# Patient Record
Sex: MALE | Race: WHITE | NOT HISPANIC OR LATINO
[De-identification: names, ages, dates, MRNs, and addresses within clinical notes are randomized per-mention and may not be internally consistent; named-entity substitution may affect disease eponyms.]

---

## 2019-05-09 PROBLEM — Z00.00 ENCOUNTER FOR PREVENTIVE HEALTH EXAMINATION: Status: ACTIVE | Noted: 2019-05-09

## 2019-05-28 ENCOUNTER — APPOINTMENT (OUTPATIENT)
Dept: UROLOGY | Facility: CLINIC | Age: 79
End: 2019-05-28

## 2019-06-03 ENCOUNTER — APPOINTMENT (OUTPATIENT)
Dept: UROLOGY | Facility: CLINIC | Age: 79
End: 2019-06-03
Payer: MEDICARE

## 2019-06-03 VITALS
TEMPERATURE: 98.3 F | BODY MASS INDEX: 21.56 KG/M2 | WEIGHT: 154 LBS | HEIGHT: 71 IN | DIASTOLIC BLOOD PRESSURE: 76 MMHG | SYSTOLIC BLOOD PRESSURE: 148 MMHG | HEART RATE: 78 BPM

## 2019-06-03 DIAGNOSIS — Z86.39 PERSONAL HISTORY OF OTHER ENDOCRINE, NUTRITIONAL AND METABOLIC DISEASE: ICD-10-CM

## 2019-06-03 DIAGNOSIS — Z86.79 PERSONAL HISTORY OF OTHER DISEASES OF THE CIRCULATORY SYSTEM: ICD-10-CM

## 2019-06-03 PROCEDURE — 99204 OFFICE O/P NEW MOD 45 MIN: CPT

## 2019-06-03 RX ORDER — CARVEDILOL 3.12 MG/1
TABLET, FILM COATED ORAL
Refills: 0 | Status: ACTIVE | COMMUNITY

## 2019-06-03 RX ORDER — AMLODIPINE BESYLATE AND BENAZEPRIL HYDROCHLORIDE 5; 10 MG/1; MG/1
5-10 CAPSULE ORAL
Refills: 0 | Status: ACTIVE | COMMUNITY

## 2019-06-03 NOTE — PHYSICAL EXAM
[General Appearance - Well Developed] : well developed [General Appearance - In No Acute Distress] : no acute distress [Normal Appearance] : normal appearance [General Appearance - Well Nourished] : well nourished [Well Groomed] : well groomed [Edema] : no peripheral edema [Exaggerated Use Of Accessory Muscles For Inspiration] : no accessory muscle use [Respiration, Rhythm And Depth] : normal respiratory rhythm and effort [Abdomen Soft] : soft [Abdomen Tenderness] : non-tender [Costovertebral Angle Tenderness] : no ~M costovertebral angle tenderness [Prostate Size ___ gm] : prostate size [unfilled] gm [Prostate Tenderness] : the prostate was not tender [No Prostate Nodules] : no prostate nodules [No Focal Deficits] : no focal deficits [Normal Station and Gait] : the gait and station were normal for the patient's age [] : no rash [Mood] : the mood was normal [Oriented To Time, Place, And Person] : oriented to person, place, and time [Affect] : the affect was normal [Not Anxious] : not anxious

## 2019-06-03 NOTE — LETTER BODY
[Dear  ___] : Dear  [unfilled], [Courtesy Letter:] : I had the pleasure of seeing your patient, [unfilled], in my office today. [Please see my note below.] : Please see my note below. [Consult Closing:] : Thank you very much for allowing me to participate in the care of this patient.  If you have any questions, please do not hesitate to contact me. [FreeTextEntry3] : Jay Fleming MD\par Urologic Oncology\par Department of Urology\par [facility]\par \par [employer] [facility] \par \par  [Sincerely,] : Sincerely, [FreeTextEntry2] : Trey Lopez, \par [address]\[place] [address]

## 2019-06-03 NOTE — HISTORY OF PRESENT ILLNESS
[Urinary Urgency] : urinary urgency [FreeTextEntry1] : This is a 79yo male here for evaluation of elevated PSA. PSA in 4/2019 was 6.2. Repeat level was 7.2 in 5/2019. No prior PSA levels available. No family history of PCa. AUASS = 15.  [Urinary Frequency] : urinary frequency [Nocturia] : nocturia [Hematuria - Gross] : no gross hematuria [Dysuria] : no dysuria [None] : None

## 2019-06-03 NOTE — ASSESSMENT
[FreeTextEntry1] : 77yo male with elevated PSA in 6-7 range\par No prior levels available\par Prostate moderately enlarged, smooth \par Reviewed PSA screening\par Reviewed options including observation, 4K score, MRI, or biopsy\par Will repeat PSA today\par F/u 6 months for observation pending PSA results

## 2019-06-04 LAB
PSA FREE FLD-MCNC: 24 %
PSA FREE SERPL-MCNC: 1.58 NG/ML
PSA SERPL-MCNC: 6.52 NG/ML

## 2019-12-09 ENCOUNTER — APPOINTMENT (OUTPATIENT)
Dept: UROLOGY | Facility: CLINIC | Age: 79
End: 2019-12-09
Payer: MEDICARE

## 2019-12-09 PROCEDURE — 99213 OFFICE O/P EST LOW 20 MIN: CPT

## 2019-12-09 NOTE — PHYSICAL EXAM
[General Appearance - Well Developed] : well developed [General Appearance - Well Nourished] : well nourished [Normal Appearance] : normal appearance [Well Groomed] : well groomed [General Appearance - In No Acute Distress] : no acute distress [Abdomen Soft] : soft [Costovertebral Angle Tenderness] : no ~M costovertebral angle tenderness [Abdomen Tenderness] : non-tender [FreeTextEntry1] : PADMA 6/2019 no nodules [Affect] : the affect was normal [Oriented To Time, Place, And Person] : oriented to person, place, and time [Not Anxious] : not anxious [Normal Station and Gait] : the gait and station were normal for the patient's age [Mood] : the mood was normal [No Focal Deficits] : no focal deficits

## 2019-12-09 NOTE — HISTORY OF PRESENT ILLNESS
[Urinary Frequency] : urinary frequency [Urinary Urgency] : urinary urgency [FreeTextEntry1] : This is a 79yo male here for evaluation of elevated PSA. PSA in 4/2019 was 6.2. Repeat level was 7.2 in 5/2019. No prior PSA levels available. No family history of PCa. AUASS = 15. \par \par 12/09/19 Here for f/u. Last PSA was 6.5. No voiding complaints.  [Nocturia] : nocturia [Hematuria - Gross] : no gross hematuria [Dysuria] : no dysuria [None] : None

## 2019-12-09 NOTE — ASSESSMENT
[FreeTextEntry1] : 77yo male with elevated PSA in 6-7 range\par Prostate moderately enlarged, smooth \par Reviewed PSA screening\par Reviewed options including observation, 4K score, MRI, or biopsy\par Will repeat PSA today\par F/u 6 months for observation pending PSA results

## 2019-12-09 NOTE — LETTER BODY
[Dear  ___] : Dear  [unfilled], [Courtesy Letter:] : I had the pleasure of seeing your patient, [unfilled], in my office today. [Consult Closing:] : Thank you very much for allowing me to participate in the care of this patient.  If you have any questions, please do not hesitate to contact me. [Please see my note below.] : Please see my note below. [Sincerely,] : Sincerely, [FreeTextEntry3] : Jay Fleming MD\par Urologic Oncology\par Department of Urology\par [facility]\par \par [employer] [facility] \par \par  [FreeTextEntry2] : Trey Lopez, \par [address]\[place] [address]

## 2019-12-10 LAB — PSA SERPL-MCNC: 6.63 NG/ML

## 2020-08-17 ENCOUNTER — APPOINTMENT (OUTPATIENT)
Dept: UROLOGY | Facility: CLINIC | Age: 80
End: 2020-08-17
Payer: MEDICARE

## 2020-08-17 VITALS
HEART RATE: 76 BPM | TEMPERATURE: 98.1 F | SYSTOLIC BLOOD PRESSURE: 178 MMHG | HEIGHT: 71 IN | BODY MASS INDEX: 21.42 KG/M2 | OXYGEN SATURATION: 97 % | WEIGHT: 153 LBS | DIASTOLIC BLOOD PRESSURE: 93 MMHG

## 2020-08-17 PROCEDURE — 99213 OFFICE O/P EST LOW 20 MIN: CPT

## 2020-08-18 LAB — PSA SERPL-MCNC: 7.05 NG/ML

## 2020-08-18 NOTE — ASSESSMENT
[FreeTextEntry1] : 78yo male with elevated PSA in 6-7 range\par Prostate moderately enlarged, smooth \par Reviewed PSA screening\par Reviewed options including observation, 4K score, MRI, or biopsy\par Will repeat PSA today\par F/u 6 months for observation pending PSA results

## 2020-08-18 NOTE — LETTER BODY
[Dear  ___] : Dear  [unfilled], [Courtesy Letter:] : I had the pleasure of seeing your patient, [unfilled], in my office today. [Please see my note below.] : Please see my note below. [Consult Closing:] : Thank you very much for allowing me to participate in the care of this patient.  If you have any questions, please do not hesitate to contact me. [FreeTextEntry2] : Trey Lopez, \par [address]\[place] [address] [Sincerely,] : Sincerely, [FreeTextEntry3] : Jay Fleming MD\par Urologic Oncology\par Department of Urology\par [facility]\par \par [employer] [facility] \par \par

## 2020-08-18 NOTE — PHYSICAL EXAM
[General Appearance - Well Nourished] : well nourished [General Appearance - Well Developed] : well developed [Well Groomed] : well groomed [Normal Appearance] : normal appearance [Abdomen Soft] : soft [General Appearance - In No Acute Distress] : no acute distress [Abdomen Tenderness] : non-tender [Costovertebral Angle Tenderness] : no ~M costovertebral angle tenderness [No Prostate Nodules] : no prostate nodules [Oriented To Time, Place, And Person] : oriented to person, place, and time [Affect] : the affect was normal [Not Anxious] : not anxious [Mood] : the mood was normal [Normal Station and Gait] : the gait and station were normal for the patient's age [No Focal Deficits] : no focal deficits

## 2020-08-18 NOTE — HISTORY OF PRESENT ILLNESS
[FreeTextEntry1] : This is a 79yo male here for evaluation of elevated PSA. PSA in 4/2019 was 6.2. Repeat level was 7.2 in 5/2019. No prior PSA levels available. No family history of PCa. AUASS = 15. \par \par 12/09/19 Here for f/u. Last PSA was 6.5. No voiding complaints. \par \par 8/17/20 Here for f/u. Last PSA was stable in 6-7 range. No significant voiding complaints.  [Urinary Urgency] : urinary urgency [Urinary Frequency] : urinary frequency [Dysuria] : no dysuria [Nocturia] : nocturia [Hematuria - Gross] : no gross hematuria [None] : None

## 2021-03-22 ENCOUNTER — APPOINTMENT (OUTPATIENT)
Dept: UROLOGY | Facility: CLINIC | Age: 81
End: 2021-03-22
Payer: MEDICARE

## 2021-03-22 VITALS — TEMPERATURE: 98.1 F | DIASTOLIC BLOOD PRESSURE: 87 MMHG | SYSTOLIC BLOOD PRESSURE: 168 MMHG | HEART RATE: 73 BPM

## 2021-03-22 PROCEDURE — 99213 OFFICE O/P EST LOW 20 MIN: CPT

## 2021-03-22 PROCEDURE — 99072 ADDL SUPL MATRL&STAF TM PHE: CPT

## 2021-03-22 NOTE — LETTER BODY
[Dear  ___] : Dear  [unfilled], [Courtesy Letter:] : I had the pleasure of seeing your patient, [unfilled], in my office today. [Please see my note below.] : Please see my note below. [Consult Closing:] : Thank you very much for allowing me to participate in the care of this patient.  If you have any questions, please do not hesitate to contact me. [Sincerely,] : Sincerely, [FreeTextEntry2] : Trey Lopez, \par [address]\[place] [address] [FreeTextEntry3] : Jay Fleming MD\par Urologic Oncology\par Department of Urology\par [facility]\par \par [employer] [facility] \par \par

## 2021-03-22 NOTE — HISTORY OF PRESENT ILLNESS
[FreeTextEntry1] : This is a 77yo male here for evaluation of elevated PSA. PSA in 4/2019 was 6.2. Repeat level was 7.2 in 5/2019. No prior PSA levels available. No family history of PCa. AUASS = 15. \par \par 12/09/19 Here for f/u. Last PSA was 6.5. No voiding complaints. \par \par 8/17/20 Here for f/u. Last PSA was stable in 6-7 range. No significant voiding complaints. \par \par 3/22/21 Here for f/u. Last PSA was stable in 6-7 range. No significant voiding complaints.  [Urinary Urgency] : urinary urgency [Urinary Frequency] : urinary frequency [Nocturia] : nocturia [Dysuria] : no dysuria [Hematuria - Gross] : no gross hematuria [None] : None

## 2021-03-22 NOTE — PHYSICAL EXAM
[General Appearance - Well Developed] : well developed [General Appearance - Well Nourished] : well nourished [Normal Appearance] : normal appearance [Well Groomed] : well groomed [General Appearance - In No Acute Distress] : no acute distress [Abdomen Soft] : soft [Abdomen Tenderness] : non-tender [Costovertebral Angle Tenderness] : no ~M costovertebral angle tenderness [FreeTextEntry1] : PADMA 8/2020 no nodules [Oriented To Time, Place, And Person] : oriented to person, place, and time [Affect] : the affect was normal [Mood] : the mood was normal [Not Anxious] : not anxious [Normal Station and Gait] : the gait and station were normal for the patient's age [No Focal Deficits] : no focal deficits

## 2021-03-22 NOTE — ASSESSMENT
[FreeTextEntry1] : 81yo male with elevated PSA in 6-7 range\par Prostate moderately enlarged, smooth \par Reviewed PSA screening\par Will repeat PSA today\par F/u 6 months for observation pending PSA results

## 2021-03-23 ENCOUNTER — NON-APPOINTMENT (OUTPATIENT)
Age: 81
End: 2021-03-23

## 2021-03-23 LAB — PSA SERPL-MCNC: 7.81 NG/ML

## 2021-09-20 ENCOUNTER — APPOINTMENT (OUTPATIENT)
Dept: UROLOGY | Facility: CLINIC | Age: 81
End: 2021-09-20
Payer: MEDICARE

## 2021-09-20 VITALS
HEART RATE: 67 BPM | BODY MASS INDEX: 21.42 KG/M2 | WEIGHT: 153 LBS | DIASTOLIC BLOOD PRESSURE: 83 MMHG | SYSTOLIC BLOOD PRESSURE: 141 MMHG | HEIGHT: 71 IN | TEMPERATURE: 97.6 F

## 2021-09-20 LAB — PSA SERPL-MCNC: 9.9 NG/ML

## 2021-09-20 PROCEDURE — 99213 OFFICE O/P EST LOW 20 MIN: CPT

## 2021-09-20 NOTE — HISTORY OF PRESENT ILLNESS
[FreeTextEntry1] : This is a 77yo male here for evaluation of elevated PSA. PSA in 4/2019 was 6.2. Repeat level was 7.2 in 5/2019. No prior PSA levels available. No family history of PCa. AUASS = 15. \par \par 12/09/19 Here for f/u. Last PSA was 6.5. No voiding complaints. \par \par 8/17/20 Here for f/u. Last PSA was stable in 6-7 range. No significant voiding complaints. \par \par 3/22/21 Here for f/u. Last PSA was stable in 6-7 range. No significant voiding complaints. \par \par 9/20/21 Here for f/u. Last PSA = 7.8.  No significant voiding complaints.  [Urinary Urgency] : urinary urgency [Urinary Frequency] : urinary frequency [Nocturia] : nocturia [Dysuria] : no dysuria [Hematuria - Gross] : no gross hematuria [None] : None

## 2021-09-20 NOTE — LETTER BODY
[Dear  ___] : Dear  [unfilled], [Courtesy Letter:] : I had the pleasure of seeing your patient, [unfilled], in my office today. [Consult Closing:] : Thank you very much for allowing me to participate in the care of this patient.  If you have any questions, please do not hesitate to contact me. [Please see my note below.] : Please see my note below. [Sincerely,] : Sincerely, [FreeTextEntry2] : Trey Lopez, \par [address]\[place] [address] [FreeTextEntry3] : Jay Fleming MD\par Urologic Oncology\par Department of Urology\par [facility]\par \par [employer] [facility] \par \par

## 2021-09-20 NOTE — PHYSICAL EXAM
[General Appearance - Well Developed] : well developed [General Appearance - Well Nourished] : well nourished [Normal Appearance] : normal appearance [Well Groomed] : well groomed [General Appearance - In No Acute Distress] : no acute distress [Abdomen Soft] : soft [Abdomen Tenderness] : non-tender [Costovertebral Angle Tenderness] : no ~M costovertebral angle tenderness [No Prostate Nodules] : no prostate nodules [Oriented To Time, Place, And Person] : oriented to person, place, and time [Affect] : the affect was normal [Mood] : the mood was normal [Not Anxious] : not anxious [Normal Station and Gait] : the gait and station were normal for the patient's age [No Focal Deficits] : no focal deficits

## 2021-09-20 NOTE — ASSESSMENT
[FreeTextEntry1] : 79yo male with elevated PSA in 6-7 range\par Prostate moderately enlarged, smooth \par Reviewed PSA screening\par Will repeat PSA today\par F/u 6 months for observation pending PSA results

## 2021-09-22 ENCOUNTER — NON-APPOINTMENT (OUTPATIENT)
Age: 81
End: 2021-09-22

## 2021-09-30 ENCOUNTER — NON-APPOINTMENT (OUTPATIENT)
Age: 81
End: 2021-09-30

## 2021-10-20 ENCOUNTER — NON-APPOINTMENT (OUTPATIENT)
Age: 81
End: 2021-10-20

## 2021-12-12 LAB — SARS-COV-2 N GENE NPH QL NAA+PROBE: NOT DETECTED

## 2021-12-13 ENCOUNTER — TRANSCRIPTION ENCOUNTER (OUTPATIENT)
Age: 81
End: 2021-12-13

## 2021-12-14 ENCOUNTER — RESULT REVIEW (OUTPATIENT)
Age: 81
End: 2021-12-14

## 2021-12-14 ENCOUNTER — APPOINTMENT (OUTPATIENT)
Dept: UROLOGY | Facility: AMBULATORY SURGERY CENTER | Age: 81
End: 2021-12-14

## 2021-12-14 ENCOUNTER — OUTPATIENT (OUTPATIENT)
Dept: OUTPATIENT SERVICES | Facility: HOSPITAL | Age: 81
LOS: 1 days | Discharge: HOME | End: 2021-12-14
Payer: MEDICARE

## 2021-12-14 PROCEDURE — 55706 BX PRST8 NDL SAT SAMPLING: CPT

## 2021-12-14 PROCEDURE — 76377 3D RENDER W/INTRP POSTPROCES: CPT | Mod: 26

## 2021-12-14 PROCEDURE — 76872 US TRANSRECTAL: CPT | Mod: 26

## 2021-12-14 PROCEDURE — 88305 TISSUE EXAM BY PATHOLOGIST: CPT | Mod: 26

## 2021-12-17 LAB — SURGICAL PATHOLOGY STUDY: SIGNIFICANT CHANGE UP

## 2021-12-22 ENCOUNTER — APPOINTMENT (OUTPATIENT)
Dept: UROLOGY | Facility: CLINIC | Age: 81
End: 2021-12-22
Payer: MEDICARE

## 2021-12-22 VITALS — DIASTOLIC BLOOD PRESSURE: 77 MMHG | TEMPERATURE: 98.2 F | SYSTOLIC BLOOD PRESSURE: 152 MMHG | HEART RATE: 78 BPM

## 2021-12-22 PROCEDURE — 99215 OFFICE O/P EST HI 40 MIN: CPT | Mod: 24

## 2021-12-23 ENCOUNTER — TRANSCRIPTION ENCOUNTER (OUTPATIENT)
Age: 81
End: 2021-12-23

## 2021-12-26 NOTE — ASSESSMENT
[FreeTextEntry1] : 82yo male s/p MRI fusion biopsy 12/14/21 \par Reviewed path: cT1c, Desi 8, GG2, involving 2/13 cores, PSA 9.9\par Discussed biopsy findings with patient and treatment options\par Patient is considered high risk by NCCN guidelines. \par Discussed need for bone scan for staging\par Natural history of high risk prostate cancer reviewed\par Standard treatment options including radical prostatectomy or radiation therapy were discussed. Radiation should be given with adjuvant hormone treatment for 2-3 years.\par Discussed alternative treatment options including focal therapy and watchful waiting.\par Radical prostatectomy is often performed by robot assisted laparoscopic technique. Discussed immediate risks of surgery including bleeding, infection, blood clot or injury to surrounding organs. Discussed long term risks including urinary incontinence and sexual dysfunction. \par Discussed risks of radiation treatment including urinary and bowel symptoms and sexual dysfunction. \par Discussed risks of adjuvant hormone treatment including hot flashes, fatigue, sexual dysfunction. \par Because the patient is high risk, he may benefit from multimodal therapy such as surgery followed by adjuvant radiation depending on final pathology results\par Considering his age, would recommend radiation + ADT\par Bone scan ordered\par Referred to Rad Onc\par F/u 1 month

## 2021-12-26 NOTE — LETTER BODY
[Dear  ___] : Dear  [unfilled], [Courtesy Letter:] : I had the pleasure of seeing your patient, [unfilled], in my office today. [Please see my note below.] : Please see my note below. [Consult Closing:] : Thank you very much for allowing me to participate in the care of this patient.  If you have any questions, please do not hesitate to contact me. [Sincerely,] : Sincerely, [FreeTextEntry2] : Trey Lopez, \par [address]\[place] [address] [FreeTextEntry3] : Jay Fleming MD\par Urologic Oncology\par Department of Urology\par [facility]\par \par [employer] at [facility] \par \par

## 2021-12-26 NOTE — PHYSICAL EXAM
[General Appearance - Well Developed] : well developed [General Appearance - Well Nourished] : well nourished [Normal Appearance] : normal appearance [Well Groomed] : well groomed [General Appearance - In No Acute Distress] : no acute distress [Abdomen Soft] : soft [Abdomen Tenderness] : non-tender [Costovertebral Angle Tenderness] : no ~M costovertebral angle tenderness [Oriented To Time, Place, And Person] : oriented to person, place, and time [Affect] : the affect was normal [Mood] : the mood was normal [Not Anxious] : not anxious [Normal Station and Gait] : the gait and station were normal for the patient's age [No Focal Deficits] : no focal deficits [FreeTextEntry1] : Recent PADMA no nodule

## 2021-12-26 NOTE — HISTORY OF PRESENT ILLNESS
[Urinary Urgency] : urinary urgency [Urinary Frequency] : urinary frequency [Nocturia] : nocturia [None] : None [FreeTextEntry1] : This is a 77yo male here for evaluation of elevated PSA. PSA in 4/2019 was 6.2. Repeat level was 7.2 in 5/2019. No prior PSA levels available. No family history of PCa. AUASS = 15. \par \par 12/09/19 Here for f/u. Last PSA was 6.5. No voiding complaints. \par \par 8/17/20 Here for f/u. Last PSA was stable in 6-7 range. No significant voiding complaints. \par \par 3/22/21 Here for f/u. Last PSA was stable in 6-7 range. No significant voiding complaints. \par \par 9/20/21 Here for f/u. Last PSA = 7.8.  No significant voiding complaints. \par \par 12/22/21 Here for f/u. PSA in 9/2021 up to 9.9. MRI 10/2021 showed PI-RADS 5 lesion right peripheral zone\par S/p MRI fusion biopsy 12/14/21\par Path: [place] 4+4=8, GG4, 2/13 cores including MRI lesion and right peripheral zone  [Dysuria] : no dysuria [Hematuria - Gross] : no gross hematuria

## 2022-01-18 ENCOUNTER — APPOINTMENT (OUTPATIENT)
Dept: RADIATION ONCOLOGY | Facility: CLINIC | Age: 82
End: 2022-01-18
Payer: MEDICARE

## 2022-01-18 VITALS
WEIGHT: 153 LBS | BODY MASS INDEX: 21.66 KG/M2 | DIASTOLIC BLOOD PRESSURE: 84 MMHG | HEART RATE: 83 BPM | OXYGEN SATURATION: 97 % | HEIGHT: 70.5 IN | SYSTOLIC BLOOD PRESSURE: 154 MMHG | TEMPERATURE: 98.3 F

## 2022-01-18 DIAGNOSIS — Z60.2 PROBLEMS RELATED TO LIVING ALONE: ICD-10-CM

## 2022-01-18 DIAGNOSIS — Z78.9 OTHER SPECIFIED HEALTH STATUS: ICD-10-CM

## 2022-01-18 PROCEDURE — 99204 OFFICE O/P NEW MOD 45 MIN: CPT | Mod: 25

## 2022-01-18 RX ORDER — SIMVASTATIN 20 MG/1
20 TABLET, FILM COATED ORAL
Qty: 90 | Refills: 0 | Status: ACTIVE | COMMUNITY
Start: 2022-01-03

## 2022-01-18 RX ORDER — PRAVASTATIN SODIUM 20 MG/1
20 TABLET ORAL
Refills: 0 | Status: DISCONTINUED | COMMUNITY
End: 2022-01-18

## 2022-01-18 SDOH — SOCIAL STABILITY - SOCIAL INSECURITY: PROBLEMS RELATED TO LIVING ALONE: Z60.2

## 2022-01-18 NOTE — REVIEW OF SYSTEMS
[Nocturia] : nocturia [Urinary Frequency] : urinary frequency [IPSS Score (0-40): ___] : IPSS score: [unfilled] [Negative] : Psychiatric

## 2022-01-30 NOTE — PHYSICAL EXAM
[Hearing Threshold Finger Rub Not Reynolds] : hearing was normal [] : no respiratory distress [Respiration, Rhythm And Depth] : normal respiratory rhythm and effort [Exaggerated Use Of Accessory Muscles For Inspiration] : no accessory muscle use [Normal] : no joint swelling, no clubbing or cyanosis of the fingernails and muscle strength and tone were normal [FreeTextEntry1] : declined

## 2022-01-30 NOTE — DISEASE MANAGEMENT
[1] : T1 [c] : c [0-10] : 0 -10 ng/mL [Biopsy with Fusion] : Patient had a biopsy with fusion on [8] : Fusion Biopsy Desi Score: 8 [] : Patient had a Prostate MRI [5] : 5 [IIC] : IIC [BiopsyDate] : 12/14/21 [MeasuredProstateVolume] : 71g [TotalCores] : 13 [TotalPositiveCores] : 2 [MaxCoreInvolvement] : 60

## 2022-01-30 NOTE — HISTORY OF PRESENT ILLNESS
[FreeTextEntry1] : Mr. Oscar Reyna is an 81 year-old male referred by Dr. 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  He presents today to review his definitive treatment options.\par \par Oncologic History:\par He has a had a rising PSA as follows:\par 6/3/19 - 6.52ng/ml\par 12/9/19 - 6.63ng/ml\par 8/17/20 - 7.05ng/ml\par 3/22/21 - 7.81ng/ml\par 9/20/21 - 9.90ng/ml\par \par 10/7/21 MRI Prostate ([facility]): \par - Volume 61 cc\par - Lesion in the lateral/posterolateral right peripheral zone at the mid gland/apex with evidence of extracapsular extension - PI-RADS 5\par - No seminal vesicle invasion, pelvic lymphadenopathy, or bony metastases.\par \par 12/14/21 MRI fusion biopsy \par Final Diagnosis \par 1. Prostate, left anterior apex, biopsy\par -Benign prostate tissue.\par 2. Prostate, left anterior base, biopsy\par -Benign prostate tissue.\par 3. Prostate, right anterior apex, biopsy\par -Benign fibromuscular tissue.\par -Prostatic glands are not identified.\par 4. Prostate, right anterior base, biopsy\par -Benign prostate tissue.\par 5. Prostate, midline apex, biopsy\par -Benign prostate tissue.\par 6. Prostate, midline base, biopsy\par -Benign prostate tissue.\par 7. Prostate, left posterior apex, biopsy\par -Benign prostate tissue.\par 8. Prostate, left posterior base, biopsy\par -Benign prostate tissue.\par 9. Prostate, right posterior apex, biopsy\par -Benign prostate tissue.\par 10. Prostate, right posterior base, biopsy\par -Adenocarcinoma of the prostate, Prognostic Grade Group 4 (Desi\par score 4+4=8) involving 60% (7 mm in length) of 1 of 1 core(s).\par 11. Prostate, left lateral, biopsy\par -Benign prostate tissue.\par 12. Prostate, right lateral, biopsy\par -Benign prostate tissue.\par 13. Prostate, right PZ, biopsy\par -Adenocarcinoma of the prostate, Prognostic Grade Group 4 (Desi\par score 4+4=8) involving 60%, 50% and 40% (6 mm, 5.5 mm and 5 mm in length)\par of 3 of 3 core(s).\par Note: Cribriform Desi pattern 4 is present.\par \par He is scheduled to undergo bone scan on 1/25/22 at [facility].  He denies a history of radiation.  He notes moderate baseline urinary function, with frequency, urgency, nocturia x 1-2, and weak FOS.  He does take Saw Palmetto.  He denies a history of baseline hemorrhoids.  He notes his last colonoscopy was in 2009 and, per patient, was without abnormality.  He notes moderate baseline erectile function he can achieve erection and orgasm with masturbation.  He declines sperm banking.\par

## 2022-01-31 ENCOUNTER — APPOINTMENT (OUTPATIENT)
Dept: UROLOGY | Facility: CLINIC | Age: 82
End: 2022-01-31

## 2022-02-07 ENCOUNTER — APPOINTMENT (OUTPATIENT)
Dept: UROLOGY | Facility: CLINIC | Age: 82
End: 2022-02-07
Payer: MEDICARE

## 2022-02-07 VITALS — DIASTOLIC BLOOD PRESSURE: 77 MMHG | TEMPERATURE: 97.6 F | SYSTOLIC BLOOD PRESSURE: 148 MMHG | HEART RATE: 92 BPM

## 2022-02-07 PROCEDURE — 99214 OFFICE O/P EST MOD 30 MIN: CPT

## 2022-02-07 NOTE — LETTER BODY
[Dear  ___] : Dear  [unfilled], [Courtesy Letter:] : I had the pleasure of seeing your patient, [unfilled], in my office today. [Please see my note below.] : Please see my note below. [Consult Closing:] : Thank you very much for allowing me to participate in the care of this patient.  If you have any questions, please do not hesitate to contact me. [Sincerely,] : Sincerely, [FreeTextEntry2] : Trey Lopez, \par [address]\[place] [address] [FreeTextEntry3] : Jay Fleming MD\par Urologic Oncology\par Department of Urology\par [facility]\par \par [employer] [facility] \par \par

## 2022-02-07 NOTE — ASSESSMENT
[FreeTextEntry1] : 82yo male s/p MRI fusion biopsy 12/14/21 \par Reviewed path: cT1c, Desi 8, GG2, involving 2/13 cores, PSA 9.9\par Patient is considered high risk by NCCN guidelines. \par Reviewed NM bone scan, equivocal lesion at L4\par Recommend PSMA PET for further characterization of L4 bone lesion\par He is interested in proceeding with EBRT\par Discussed risks/benefits of ADT. 2 years of ADT is recommended but he is concerned about side effects\par Considering his age and low volume disease, reasonable to defer ADT pending PSMA results\par If PSMA is negative, will plan to defer ADT\par If PSMA is positive, would recommend EBRT with ADT

## 2022-02-07 NOTE — HISTORY OF PRESENT ILLNESS
[Urinary Urgency] : urinary urgency [Urinary Frequency] : urinary frequency [Nocturia] : nocturia [None] : None [FreeTextEntry1] : This is a 79yo male here for evaluation of elevated PSA. PSA in 4/2019 was 6.2. Repeat level was 7.2 in 5/2019. No prior PSA levels available. No family history of PCa. AUASS = 15. \par \par 12/09/19 Here for f/u. Last PSA was 6.5. No voiding complaints. \par \par 8/17/20 Here for f/u. Last PSA was stable in 6-7 range. No significant voiding complaints. \par \par 3/22/21 Here for f/u. Last PSA was stable in 6-7 range. No significant voiding complaints. \par \par 9/20/21 Here for f/u. Last PSA = 7.8.  No significant voiding complaints. \par \par 12/22/21 Here for f/u. PSA in 9/2021 up to 9.9. MRI 10/2021 showed PI-RADS 5 lesion right peripheral zone\par S/p MRI fusion biopsy 12/14/21\par Path: [place] 4+4=8, GG4, 2/13 cores including MRI lesion and right peripheral zone \par \par 2/07/22 Here for f/u. Had bone scan which shows equivocal bone lesion in L4. No symptoms\par Met with Rad Onc. Interested in proceeding with radiation but interested in deferring ADT\par \par  [Dysuria] : no dysuria [Hematuria - Gross] : no gross hematuria

## 2022-02-08 LAB
ALBUMIN SERPL ELPH-MCNC: 5 G/DL
ALP BLD-CCNC: 75 U/L
ALT SERPL-CCNC: 22 U/L
ANION GAP SERPL CALC-SCNC: 14 MMOL/L
AST SERPL-CCNC: 24 U/L
BILIRUB SERPL-MCNC: 0.8 MG/DL
BUN SERPL-MCNC: 18 MG/DL
CALCIUM SERPL-MCNC: 9.8 MG/DL
CHLORIDE SERPL-SCNC: 100 MMOL/L
CO2 SERPL-SCNC: 27 MMOL/L
CREAT SERPL-MCNC: 0.84 MG/DL
GLUCOSE SERPL-MCNC: 120 MG/DL
POTASSIUM SERPL-SCNC: 3.7 MMOL/L
PROT SERPL-MCNC: 7.6 G/DL
PSA SERPL-MCNC: 11.4 NG/ML
SODIUM SERPL-SCNC: 141 MMOL/L

## 2022-02-09 ENCOUNTER — NON-APPOINTMENT (OUTPATIENT)
Age: 82
End: 2022-02-09

## 2022-03-02 ENCOUNTER — OUTPATIENT (OUTPATIENT)
Dept: OUTPATIENT SERVICES | Facility: HOSPITAL | Age: 82
LOS: 1 days | End: 2022-03-02
Payer: MEDICARE

## 2022-03-02 LAB — GLUCOSE BLDC GLUCOMTR-MCNC: 88 MG/DL — SIGNIFICANT CHANGE UP (ref 70–99)

## 2022-03-02 PROCEDURE — A9595: CPT

## 2022-03-02 PROCEDURE — 78816 PET IMAGE W/CT FULL BODY: CPT

## 2022-03-02 PROCEDURE — 78816 PET IMAGE W/CT FULL BODY: CPT | Mod: 26,PI

## 2022-03-02 PROCEDURE — 82962 GLUCOSE BLOOD TEST: CPT

## 2022-03-21 ENCOUNTER — APPOINTMENT (OUTPATIENT)
Dept: UROLOGY | Facility: CLINIC | Age: 82
End: 2022-03-21
Payer: MEDICARE

## 2022-03-21 VITALS — TEMPERATURE: 98.3 F | SYSTOLIC BLOOD PRESSURE: 137 MMHG | HEART RATE: 70 BPM | DIASTOLIC BLOOD PRESSURE: 81 MMHG

## 2022-03-21 PROCEDURE — 99214 OFFICE O/P EST MOD 30 MIN: CPT

## 2022-03-21 NOTE — ASSESSMENT
[FreeTextEntry1] : 80yo male s/p MRI fusion biopsy 12/14/21 \par Reviewed path: cT1c, Desi 8, GG2, involving 2/13 cores, PSA 9.9\par Patient is considered high risk by NCCN guidelines. \par Reviewed NM bone scan, equivocal lesion at L4\par Reviewed PSMA PET, no bone lesion but concerning small lymph nodes that are PET avid\par \par He is interested in proceeding with EBRT\par Discussed risks/benefits of ADT. 2 years of ADT is recommended but he is concerned about side effects\par Recommend Orgovyx which is associated with lower cardiovascular risks\par He is willing to trial this medication \par NEJM paper reviewed with patient\par \par F/u with Rad Onc

## 2022-03-21 NOTE — LETTER BODY
[Dear  ___] : Dear  [unfilled], [Courtesy Letter:] : I had the pleasure of seeing your patient, [unfilled], in my office today. [Please see my note below.] : Please see my note below. [Consult Closing:] : Thank you very much for allowing me to participate in the care of this patient.  If you have any questions, please do not hesitate to contact me. [Sincerely,] : Sincerely, [FreeTextEntry2] : Trey Lopez, \par [address]\[place] [address] [FreeTextEntry3] : Jay Fleming MD\par Urologic Oncology\par Department of Urology\par [facility]\par \par [employer] [facility] \par \par

## 2022-03-21 NOTE — HISTORY OF PRESENT ILLNESS
[Urinary Urgency] : urinary urgency [Urinary Frequency] : urinary frequency [Nocturia] : nocturia [None] : None [FreeTextEntry1] : This is a 79yo male here for evaluation of elevated PSA. PSA in 4/2019 was 6.2. Repeat level was 7.2 in 5/2019. No prior PSA levels available. No family history of PCa. AUASS = 15. \par \par 12/09/19 Here for f/u. Last PSA was 6.5. No voiding complaints. \par \par 8/17/20 Here for f/u. Last PSA was stable in 6-7 range. No significant voiding complaints. \par \par 3/22/21 Here for f/u. Last PSA was stable in 6-7 range. No significant voiding complaints. \par \par 9/20/21 Here for f/u. Last PSA = 7.8.  No significant voiding complaints. \par \par 12/22/21 Here for f/u. PSA in 9/2021 up to 9.9. MRI 10/2021 showed PI-RADS 5 lesion right peripheral zone\par S/p MRI fusion biopsy 12/14/21\par Path: [place] 4+4=8, GG4, 2/13 cores including MRI lesion and right peripheral zone \par \par 2/07/22 Here for f/u. Had bone scan which shows equivocal bone lesion in L4. No symptoms\par Met with Rad Onc. Interested in proceeding with radiation but interested in deferring ADT\par \par 3/21/22 Here for f/u to review PSMA PET\par PET results:\par 1. Since 10/7/2021, the prostate is again enlarged and contains a focus of pilflufolastat avidity in the right (identified on prior MR prostate) posterior transitional zone toward the apex, consistent with known prostate malignancy.\par \par 2. There is a 0.9 cm intensely pilflufolastat avid (SUV 24.6) perirectal lymph node and 0.5 mm mildly avid lymph node slightly superiorly, consistent with local metastasis.\par \par 3. 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  Unfortunately this possibility cannot be confirmed on the accompanying non-contrast CT and no definite adenopathy is present.\par  [Dysuria] : no dysuria [Hematuria - Gross] : no gross hematuria

## 2022-03-23 RX ORDER — RELUGOLIX 120 MG/1
120 TABLET, FILM COATED ORAL DAILY
Qty: 30 | Refills: 3 | Status: ACTIVE | COMMUNITY
Start: 2022-03-21 | End: 1900-01-01

## 2022-05-23 ENCOUNTER — APPOINTMENT (OUTPATIENT)
Dept: UROLOGY | Facility: CLINIC | Age: 82
End: 2022-05-23
Payer: MEDICARE

## 2022-05-23 VITALS
TEMPERATURE: 97.3 F | HEART RATE: 71 BPM | OXYGEN SATURATION: 98 % | SYSTOLIC BLOOD PRESSURE: 139 MMHG | DIASTOLIC BLOOD PRESSURE: 84 MMHG

## 2022-05-23 PROCEDURE — 99213 OFFICE O/P EST LOW 20 MIN: CPT

## 2022-05-23 NOTE — ASSESSMENT
[FreeTextEntry1] : 80yo male cT1c, Desi 8, GG2, involving 2/13 cores, PSA 9.9\par Patient is considered high risk by NCCN guidelines. \par Reviewed PSMA PET, no bone lesion but concerning small lymph nodes that are PET avid\par He is interested in proceeding with EBRT\par 2 years of ADT is recommended\par Continue Orgovyx which is associated with lower cardiovascular risks\par Tolerating Orgovyx well\par F/u with Rad Onc \par PSA, testosterone today\par \par F/u 3 months

## 2022-05-23 NOTE — LETTER BODY
[Dear  ___] : Dear  [unfilled], [Courtesy Letter:] : I had the pleasure of seeing your patient, [unfilled], in my office today. [Please see my note below.] : Please see my note below. [Consult Closing:] : Thank you very much for allowing me to participate in the care of this patient.  If you have any questions, please do not hesitate to contact me. [Sincerely,] : Sincerely, [FreeTextEntry2] : Trey Lopez, \par [address]\[place] [address] [FreeTextEntry3] : Jay Fleming MD\par Urologic Oncology\par Department of Urology\par [facility]\par \par [employer] [facility] \par \par

## 2022-05-23 NOTE — HISTORY OF PRESENT ILLNESS
[Urinary Urgency] : urinary urgency [Urinary Frequency] : urinary frequency [Nocturia] : nocturia [None] : None [FreeTextEntry1] : This is a 79yo male here for evaluation of elevated PSA. PSA in 4/2019 was 6.2. Repeat level was 7.2 in 5/2019. No prior PSA levels available. No family history of PCa. AUASS = 15. \par \par 12/09/19 Here for f/u. Last PSA was 6.5. No voiding complaints. \par \par 8/17/20 Here for f/u. Last PSA was stable in 6-7 range. No significant voiding complaints. \par \par 3/22/21 Here for f/u. Last PSA was stable in 6-7 range. No significant voiding complaints. \par \par 9/20/21 Here for f/u. Last PSA = 7.8.  No significant voiding complaints. \par \par 12/22/21 Here for f/u. PSA in 9/2021 up to 9.9. MRI 10/2021 showed PI-RADS 5 lesion right peripheral zone\par S/p MRI fusion biopsy 12/14/21\par Path: [place] 4+4=8, GG4, 2/13 cores including MRI lesion and right peripheral zone \par \par 2/07/22 Here for f/u. Had bone scan which shows equivocal bone lesion in L4. No symptoms\par Met with Rad Onc. Interested in proceeding with radiation but interested in deferring ADT\par \par 3/21/22 Here for f/u to review PSMA PET\par PET results:\par 1. Since 10/7/2021, the prostate is again enlarged and contains a focus of pilflufolastat avidity in the right (identified on prior MR prostate) posterior transitional zone toward the apex, consistent with known prostate malignancy.\par \par 2. There is a 0.9 cm intensely pilflufolastat avid (SUV 24.6) perirectal lymph node and 0.5 mm mildly avid lymph node slightly superiorly, consistent with local metastasis.\par \par 3. 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  Unfortunately this possibility cannot be confirmed on the accompanying non-contrast CT and no definite adenopathy is present.\par \par 5/23/22 Here for f/u. Has been on Orgovyx for about 6 weeks. Doing well, minimal side effects. Has not started radiation yet. \par  [Dysuria] : no dysuria [Hematuria - Gross] : no gross hematuria

## 2022-05-24 ENCOUNTER — APPOINTMENT (OUTPATIENT)
Dept: RADIATION ONCOLOGY | Facility: CLINIC | Age: 82
End: 2022-05-24
Payer: MEDICARE

## 2022-05-24 ENCOUNTER — NON-APPOINTMENT (OUTPATIENT)
Age: 82
End: 2022-05-24

## 2022-05-24 ENCOUNTER — APPOINTMENT (OUTPATIENT)
Dept: RADIATION ONCOLOGY | Facility: CLINIC | Age: 82
End: 2022-05-24

## 2022-05-24 LAB
ALBUMIN SERPL ELPH-MCNC: 4.8 G/DL
ALP BLD-CCNC: 71 U/L
ALT SERPL-CCNC: 25 U/L
ANION GAP SERPL CALC-SCNC: 10 MMOL/L
AST SERPL-CCNC: 29 U/L
BILIRUB SERPL-MCNC: 0.6 MG/DL
BUN SERPL-MCNC: 21 MG/DL
CALCIUM SERPL-MCNC: 9.5 MG/DL
CHLORIDE SERPL-SCNC: 104 MMOL/L
CO2 SERPL-SCNC: 28 MMOL/L
CREAT SERPL-MCNC: 0.92 MG/DL
EGFR: 84 ML/MIN/1.73M2
GLUCOSE SERPL-MCNC: 111 MG/DL
POTASSIUM SERPL-SCNC: 4.6 MMOL/L
PROT SERPL-MCNC: 6.9 G/DL
PSA SERPL-MCNC: 2.95 NG/ML
SODIUM SERPL-SCNC: 143 MMOL/L
TESTOST SERPL-MCNC: 5.9 NG/DL

## 2022-06-07 ENCOUNTER — NON-APPOINTMENT (OUTPATIENT)
Age: 82
End: 2022-06-07

## 2022-06-07 ENCOUNTER — APPOINTMENT (OUTPATIENT)
Dept: RADIATION ONCOLOGY | Facility: CLINIC | Age: 82
End: 2022-06-07
Payer: MEDICARE

## 2022-06-07 VITALS
OXYGEN SATURATION: 99 % | HEART RATE: 61 BPM | TEMPERATURE: 98.4 F | RESPIRATION RATE: 18 BRPM | SYSTOLIC BLOOD PRESSURE: 134 MMHG | WEIGHT: 154.9 LBS | DIASTOLIC BLOOD PRESSURE: 79 MMHG | BODY MASS INDEX: 21.91 KG/M2

## 2022-06-07 PROCEDURE — 99215 OFFICE O/P EST HI 40 MIN: CPT

## 2022-06-07 NOTE — VITALS
[Maximal Pain Intensity: 0/10] : 0/10 [Least Pain Intensity: 0/10] : 0/10 [90: Able to carry normal activity; minor signs or symptoms of disease.] : 90: Able to carry normal activity; minor signs or symptoms of disease.  [ECOG Performance Status: 1 - Restricted in physically strenuous activity but ambulatory and able to carry out work of a light or sedentary nature] : Performance Status: 1 - Restricted in physically strenuous activity but ambulatory and able to carry out work of a light or sedentary nature, e.g., light house work, office work [Date: ____________] : Patient's last distress assessment performed on [unfilled]. [1 - Distress Level] : Distress Level: 1

## 2022-06-07 NOTE — PHYSICAL EXAM
[] : no respiratory distress [Normal] : the ears, nose and oropharynx were normal in appearance [FreeTextEntry1] : declined

## 2022-06-07 NOTE — DISEASE MANAGEMENT
[BiopsyDate] : 12/14/21 [MeasuredProstateVolume] : 71g [TotalCores] : 13 [TotalPositiveCores] : 2 [MaxCoreInvolvement] : 60

## 2022-06-07 NOTE — HISTORY OF PRESENT ILLNESS
[FreeTextEntry1] : Mr. Oscar Reyna is an 81 year-old male referred by Dr. 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  He presents today to again review his definitive treatment options.\par \par Oncologic History:\par He has a had a rising PSA as follows:\par 6/3/19 - 6.52ng/ml\par 12/9/19 - 6.63ng/ml\par 8/17/20 - 7.05ng/ml\par 3/22/21 - 7.81ng/ml\par 9/20/21 - 9.90ng/ml\par \par 10/7/21 MRI Prostate ([facility]): \par - Volume 61 cc\par - Lesion in the lateral/posterolateral right peripheral zone at the mid gland/apex with evidence of extracapsular extension - PI-RADS 5\par - No seminal vesicle invasion, pelvic lymphadenopathy, or bony metastases.\par \par 12/14/21 MRI fusion biopsy \par Final Diagnosis \par 1. Prostate, left anterior apex, biopsy\par -Benign prostate tissue.\par 2. Prostate, left anterior base, biopsy\par -Benign prostate tissue.\par 3. Prostate, right anterior apex, biopsy\par -Benign fibromuscular tissue.\par -Prostatic glands are not identified.\par 4. Prostate, right anterior base, biopsy\par -Benign prostate tissue.\par 5. Prostate, midline apex, biopsy\par -Benign prostate tissue.\par 6. Prostate, midline base, biopsy\par -Benign prostate tissue.\par 7. Prostate, left posterior apex, biopsy\par -Benign prostate tissue.\par 8. Prostate, left posterior base, biopsy\par -Benign prostate tissue.\par 9. Prostate, right posterior apex, biopsy\par -Benign prostate tissue.\par 10. Prostate, right posterior base, biopsy\par -Adenocarcinoma of the prostate, Prognostic Grade Group 4 ([place]\par score 4+4=8) involving 60% (7 mm in length) of 1 of 1 core(s).\par 11. Prostate, left lateral, biopsy\par -Benign prostate tissue.\par 12. Prostate, right lateral, biopsy\par -Benign prostate tissue.\par 13. Prostate, right PZ, biopsy\par -Adenocarcinoma of the prostate, Prognostic Grade Group 4 (Desi\par score 4+4=8) involving 60%, 50% and 40% (6 mm, 5.5 mm and 5 mm in length)\par of 3 of 3 core(s).\par Note: Cribriform Desi pattern 4 is present.\par \par 1/25/22- Whole Body Bone Scan\par IMPRESSION:\par \par 1. Abnormal uptake diffusely involving the L4 vertebral body is nonspecific and warrants anatomic correlation with either radiographic or CT imaging. \par \par 2. Likely degenerative changes in the rest of the spine and extremities, which could be also be confirmed with anatomic imaging.\par \par 3. Presumed periodontal inflammatory changes in the horizontal ramus of the right hemimandible. Correlation with dedicated dental evaluation is suggested.\par \par \par 2/7/22- PSA 11.4 ng/mL\par \par \par 3/2/22- PET/ CT PSMA\par Impression:\par 1. Since 10/7/2021, the prostate is again enlarged and contains a focus of pilflufolastat avidity in the right (identified on prior MR prostate) posterior transitional zone toward the apex, consistent with known prostate malignancy.\par \par 2. There is a 0.9 cm intensely pilflufolastat avid (SUV 24.6) perirectal lymph node and 0.5 mm mildly avid lymph node slightly superiorly, consistent with local metastasis.\par \par 3. 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  Unfortunately this possibility cannot be confirmed on the accompanying non-contrast CT and no definite adenopathy is present.\par \par Therefore, a contrast CT of the abdomen and pelvis is suggested.\par \par 3/21/22-  F/U with Dr. Fleming and started on Orgovyx\par \par 5/23/22- PSA 2.95 ng/mL w/ Testosterone 5.9 ng/dL\par \par 6/3/22- Colonoscopy- \par \par Today he presents for consideration of radiation therapy to the prostate, referred by Dr. Fleming. He denies a history of radiation.  He notes moderate baseline urinary function, with frequency, urgency, nocturia x2, and strong urine stream since he started Orgovyx.  He does take Saw Palmetto.  He denies a history of baseline hemorrhoids.  His last colonoscopy was 6/3/22 with no polyps removed and diverticulosis of large intestine. He notes he is unable to have erections at this time. He declines sperm banking. \par

## 2022-07-15 ENCOUNTER — NON-APPOINTMENT (OUTPATIENT)
Age: 82
End: 2022-07-15

## 2022-07-22 ENCOUNTER — NON-APPOINTMENT (OUTPATIENT)
Age: 82
End: 2022-07-22

## 2022-07-25 ENCOUNTER — NON-APPOINTMENT (OUTPATIENT)
Age: 82
End: 2022-07-25

## 2022-08-01 ENCOUNTER — APPOINTMENT (OUTPATIENT)
Dept: UROLOGY | Facility: CLINIC | Age: 82
End: 2022-08-01

## 2022-08-01 VITALS
HEART RATE: 69 BPM | OXYGEN SATURATION: 100 % | TEMPERATURE: 98 F | WEIGHT: 154 LBS | BODY MASS INDEX: 22.05 KG/M2 | HEIGHT: 70 IN | SYSTOLIC BLOOD PRESSURE: 127 MMHG | DIASTOLIC BLOOD PRESSURE: 80 MMHG

## 2022-08-01 DIAGNOSIS — R97.20 ELEVATED PROSTATE, SPECIFIC ANTIGEN [PSA]: ICD-10-CM

## 2022-08-01 PROCEDURE — A4648: CPT | Mod: NC

## 2022-08-01 PROCEDURE — 55876 PLACE RT DEVICE/MARKER PROS: CPT

## 2022-08-01 PROCEDURE — 55874Z: CUSTOM

## 2022-08-02 PROBLEM — R97.20 ELEVATED PSA, LESS THAN 10 NG/ML: Status: RESOLVED | Noted: 2019-06-03 | Resolved: 2022-08-02

## 2022-08-29 ENCOUNTER — NON-APPOINTMENT (OUTPATIENT)
Age: 82
End: 2022-08-29

## 2022-08-31 ENCOUNTER — APPOINTMENT (OUTPATIENT)
Dept: UROLOGY | Facility: CLINIC | Age: 82
End: 2022-08-31

## 2022-08-31 VITALS — DIASTOLIC BLOOD PRESSURE: 69 MMHG | HEART RATE: 62 BPM | SYSTOLIC BLOOD PRESSURE: 118 MMHG | TEMPERATURE: 98.6 F

## 2022-08-31 PROCEDURE — 99213 OFFICE O/P EST LOW 20 MIN: CPT

## 2022-08-31 NOTE — PHYSICAL EXAM
[General Appearance - Well Developed] : well developed [General Appearance - Well Nourished] : well nourished [Normal Appearance] : normal appearance [Well Groomed] : well groomed [General Appearance - In No Acute Distress] : no acute distress [Abdomen Soft] : soft [Abdomen Tenderness] : non-tender [Costovertebral Angle Tenderness] : no ~M costovertebral angle tenderness [Oriented To Time, Place, And Person] : oriented to person, place, and time [Affect] : the affect was normal [Mood] : the mood was normal [Not Anxious] : not anxious [Normal Station and Gait] : the gait and station were normal for the patient's age [No Focal Deficits] : no focal deficits [Urinary Bladder Findings] : the bladder was normal on palpation

## 2022-08-31 NOTE — ASSESSMENT
[FreeTextEntry1] : 80yo male cT1c, Desi 8, GG2, involving 2/13 cores, PSA 9.9\par Patient is considered high risk by NCCN guidelines. \par PSMA PET, no bone lesion but concerning small lymph nodes that are PET avid\par Currently undergoing EBRT\par 2 years of ADT is recommended\par Continue Orgovyx which is associated with lower cardiovascular risks\par Tolerating Orgovyx well\par F/u with Rad Onc \par Discussed adding tamsulosin for LUTS. He is not interested at this time. \par \par F/u 4 months

## 2022-08-31 NOTE — HISTORY OF PRESENT ILLNESS
[Urinary Urgency] : urinary urgency [Urinary Frequency] : urinary frequency [Nocturia] : nocturia [None] : None [FreeTextEntry1] : This is a 79yo male here for evaluation of elevated PSA. PSA in 4/2019 was 6.2. Repeat level was 7.2 in 5/2019. No prior PSA levels available. No family history of PCa. AUASS = 15. \par \par 12/09/19 Here for f/u. Last PSA was 6.5. No voiding complaints. \par \par 8/17/20 Here for f/u. Last PSA was stable in 6-7 range. No significant voiding complaints. \par \par 3/22/21 Here for f/u. Last PSA was stable in 6-7 range. No significant voiding complaints. \par \par 9/20/21 Here for f/u. Last PSA = 7.8.  No significant voiding complaints. \par \par 12/22/21 Here for f/u. PSA in 9/2021 up to 9.9. MRI 10/2021 showed PI-RADS 5 lesion right peripheral zone\par S/p MRI fusion biopsy 12/14/21\par Path: [place] 4+4=8, GG4, 2/13 cores including MRI lesion and right peripheral zone \par \par 2/07/22 Here for f/u. Had bone scan which shows equivocal bone lesion in L4. No symptoms\par Met with Rad Onc. Interested in proceeding with radiation but interested in deferring ADT\par \par 3/21/22 Here for f/u to review PSMA PET\par PET results:\par 1. Since 10/7/2021, the prostate is again enlarged and contains a focus of pilflufolastat avidity in the right (identified on prior MR prostate) posterior transitional zone toward the apex, consistent with known prostate malignancy.\par \par 2. There is a 0.9 cm intensely pilflufolastat avid (SUV 24.6) perirectal lymph node and 0.5 mm mildly avid lymph node slightly superiorly, consistent with local metastasis.\par \par 3. 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  Unfortunately this possibility cannot be confirmed on the accompanying non-contrast CT and no definite adenopathy is present.\par \par 5/23/22 Here for f/u. Has been on Orgovyx for about 6 weeks. Doing well, minimal side effects. Has not started radiation yet. \par \par 8/31/22 Here for f/u. Currently undergoing radiation. Some LUTS including urgency, frequency, weak stream. Symptoms initially improved on Orgovyx but now worsening. Symptoms are moderate.  [Dysuria] : no dysuria [Hematuria - Gross] : no gross hematuria

## 2022-08-31 NOTE — LETTER BODY
[Dear  ___] : Dear  [unfilled], [Courtesy Letter:] : I had the pleasure of seeing your patient, [unfilled], in my office today. [Please see my note below.] : Please see my note below. [Consult Closing:] : Thank you very much for allowing me to participate in the care of this patient.  If you have any questions, please do not hesitate to contact me. [Sincerely,] : Sincerely, [FreeTextEntry2] : Trey Lopez, \par [address]\[place] [address] [FreeTextEntry3] : Jay Fleming MD\par Urologic Oncology\par Department of Urology\par [facility]\par \par [employer] [facility] \par \par

## 2022-09-06 ENCOUNTER — NON-APPOINTMENT (OUTPATIENT)
Age: 82
End: 2022-09-06

## 2022-09-06 NOTE — DISEASE MANAGEMENT
[IIC] : IIC [3] : T3 [a] : a [1] : N1 [0-10] : 0 -10 ng/mL [Biopsy with Fusion] : Patient had a biopsy with fusion on [8] : Fusion Biopsy Desi Score: 8 [] : Patient had a Prostate MRI [5] : 5 [de-identified] : 2160yGy [de-identified] : 70cGy [de-identified] : PROSTATE/SV/LN [BiopsyDate] : 12/14/21 [MeasuredProstateVolume] : 71g [TotalCores] : 13 [TotalPositiveCores] : 2 [MaxCoreInvolvement] : 60

## 2022-09-06 NOTE — HISTORY OF PRESENT ILLNESS
[FreeTextEntry1] : 09/06/2022 - OTV - Today he has completed  2160 cGy / 7020 cGy and notes he is feeling well.Continued on Orgovyx for the past 6 weeks, tolerating it well. He states his symptoms initially improved with Orgovyx but now experiencing nocturia x4, urinary frequency, urgency and weak stream. Denies dysuria, leakage or incontinence.  He denies blood in the urine.  He has no blood or mucous in the stool and denies rectal pain. He has regular bowel movement  x 2 per day which are well formed. Patient is tolerating radiation treatment well. Continue with radiation treatment as scheduled, follow-up next week as scheduled or earlier if issues develop.Seen by  on 8/31/2022.\par \par 08/29/2022 - FIRST OTV - Today he has completed  810 cGy out of 7020 cGy and notes he is feeling well. Pt c/o no appreciable symptomatology related to his definitive radiation therapy.  Specifically, he notes baseline urine function with nocturia x 3, while not requiring alpha blocker medication. Patient on Orgovyx since 4/2022 and states he usually has a good stream but the stream is not so strong after starting RT. He also complains of urinary frequency.  patient encouraged to decrease his fluid intake at night.He denies urinary urgency, dysuria, leakage or incontinence.  He denies blood in the urine.  He has no blood or mucous in the stool and denies rectal pain. He has regular bowel movement  x 2 per day which are well formed. Patient is tolerating radiation treatment well. Continue with radiation treatment as scheduled, follow-up next week as scheduled or earlier if issues develop. Scheduled to see  on 8/31/22 for latest PSA level.\par \par Mr. Oscar Reyna is an 81 year-old male referred by Dr. 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  He presents today to again review his definitive treatment options.\par \par Oncologic History:\par He has a had a rising PSA as follows:\par 6/3/19 - 6.52 ng/ml\par 12/9/19 - 6.63 ng/ml\par 8/17/20 - 7.05 ng/ml\par 3/22/21 - 7.81 ng/ml\par 9/20/21 - 9.90 ng/ml\par 2/07/22 - 11.40 ng/ml\par STARTED ORGOVYX ON 4/2022\par 5/23/22 - 2.95 ng/ml\par \par 10/7/21 MRI Prostate ([facility]): \par - Volume 61 cc\par - Lesion in the lateral/posterolateral right peripheral zone at the mid gland/apex with evidence of extracapsular extension - PI-RADS 5\par - No seminal vesicle invasion, pelvic lymphadenopathy, or bony metastases.\par \par 12/14/21 MRI fusion biopsy \par Final Diagnosis \par 1. Prostate, left anterior apex, biopsy\par -Benign prostate tissue.\par 2. Prostate, left anterior base, biopsy\par -Benign prostate tissue.\par 3. Prostate, right anterior apex, biopsy\par -Benign fibromuscular tissue.\par -Prostatic glands are not identified.\par 4. Prostate, right anterior base, biopsy\par -Benign prostate tissue.\par 5. Prostate, midline apex, biopsy\par -Benign prostate tissue.\par 6. Prostate, midline base, biopsy\par -Benign prostate tissue.\par 7. Prostate, left posterior apex, biopsy\par -Benign prostate tissue.\par 8. Prostate, left posterior base, biopsy\par -Benign prostate tissue.\par 9. Prostate, right posterior apex, biopsy\par -Benign prostate tissue.\par 10. Prostate, right posterior base, biopsy\par -Adenocarcinoma of the prostate, Prognostic Grade Group 4 ([place]\par score 4+4=8) involving 60% (7 mm in length) of 1 of 1 core(s).\par 11. Prostate, left lateral, biopsy\par -Benign prostate tissue.\par 12. Prostate, right lateral, biopsy\par -Benign prostate tissue.\par 13. Prostate, right PZ, biopsy\par -Adenocarcinoma of the prostate, Prognostic Grade Group 4 ([place]\par score 4+4=8) involving 60%, 50% and 40% (6 mm, 5.5 mm and 5 mm in length)\par of 3 of 3 core(s).\par Note: Cribriform Desi pattern 4 is present.\par \par 1/25/22- Whole Body Bone Scan\par IMPRESSION:\par \par 1. Abnormal uptake diffusely involving the L4 vertebral body is nonspecific and warrants anatomic correlation with either radiographic or CT imaging. \par \par 2. Likely degenerative changes in the rest of the spine and extremities, which could be also be confirmed with anatomic imaging.\par \par 3. Presumed periodontal inflammatory changes in the horizontal ramus of the right hemimandible. Correlation with dedicated dental evaluation is suggested.\par \par \par 2/7/22- PSA 11.4 ng/mL\par \par \par 3/2/22- PET/ CT PSMA\par Impression:\par 1. Since 10/7/2021, the prostate is again enlarged and contains a focus of pilflufolastat avidity in the right (identified on prior MR prostate) posterior transitional zone toward the apex, consistent with known prostate malignancy.\par \par 2. There is a 0.9 cm intensely pilflufolastat avid (SUV 24.6) perirectal lymph node and 0.5 mm mildly avid lymph node slightly superiorly, consistent with local metastasis.\par \par 3. 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  Unfortunately this possibility cannot be confirmed on the accompanying non-contrast CT and no definite adenopathy is present.\par \par Therefore, a contrast CT of the abdomen and pelvis is suggested.\par \par 3/21/22-  F/U with Dr. Fleming and started on Orgovyx\par \par 5/23/22- PSA 2.95 ng/mL w/ Testosterone 5.9 ng/dL\par \par 6/3/22- Colonoscopy- \par \par Today he presents for consideration of radiation therapy to the prostate, referred by Dr. Fleming. He denies a history of radiation.  He notes moderate baseline urinary function, with frequency, urgency, nocturia x2, and strong urine stream since he started Orgovyx.  He does take Saw Palmetto.  He denies a history of baseline hemorrhoids.  His last colonoscopy was 6/3/22 with no polyps removed and diverticulosis of large intestine. He notes he is unable to have erections at this time. He declines sperm banking. \par

## 2022-09-06 NOTE — REASON FOR VISIT
[Routine On-Treatment] : a routine on-treatment visit for [Consideration of Curative Therapy] : consideration of curative therapy for prostate cancer

## 2022-09-06 NOTE — REVIEW OF SYSTEMS
[Constipation: Grade 0] : Constipation: Grade 0 [Diarrhea: Grade 0] : Diarrhea: Grade 0 [Proctitis: Grade 0] : Proctitis: Grade 0 [Rectal Pain: Grade 0] : Rectal Pain: Grade 0 [Edema Limbs: Grade 0] : Edema Limbs: Grade 0  [Fatigue: Grade 0] : Fatigue: Grade 0 [Localized Edema: Grade 0] : Localized Edema: Grade 0  [Neck Edema: Grade 0] : Neck Edema: Grade 0 [Hematuria: Grade 0] : Hematuria: Grade 0 [Urinary Incontinence: Grade 0] : Urinary Incontinence: Grade 0  [Urinary Retention: Grade 0] : Urinary Retention: Grade 0 [Urinary Tract Pain: Grade 0] : Urinary Tract Pain: Grade 0 [Urinary Urgency: Grade 0] : Urinary Urgency: Grade 0 [Urinary Frequency: Grade 1 - Present] : Urinary Frequency: Grade 1 - Present [Ejaculation Disorder: Grade 0] : Ejaculation Disorder: Grade 0 [Erectile Dysfunction: Grade 0] : Erectile Dysfunction: Grade 0 [Pruritus: Grade 0] : Pruritus: Grade 0 [Skin Atrophy: Grade 0] : Skin Atrophy: Grade 0 [Skin Hyperpigmentation: Grade 0] : Skin Hyperpigmentation: Grade 0 [Skin Induration: Grade 0] : Skin Induration: Grade 0 [Dermatitis Radiation: Grade 0] : Dermatitis Radiation: Grade 0 [FreeTextEntry9] : Nocturia x 3

## 2022-09-06 NOTE — PHYSICAL EXAM
[] : no respiratory distress [Exaggerated Use Of Accessory Muscles For Inspiration] : no accessory muscle use [Normal] : no focal deficits [Oriented To Time, Place, And Person] : oriented to person, place, and time

## 2022-09-07 NOTE — DISEASE MANAGEMENT
[IIC] : IIC [3] : T3 [a] : a [1] : N1 [0-10] : 0 -10 ng/mL [Biopsy with Fusion] : Patient had a biopsy with fusion on [8] : Fusion Biopsy Desi Score: 8 [] : Patient had a Prostate MRI [5] : 5 [Clinical] : TNM Stage: c [TTNM] : 1c [NTNM] : 1 [MTNM] : 0 [de-identified] : 782cGy [de-identified] : 7032cGy [de-identified] : PROSTATE/SV/LN [BiopsyDate] : 12/14/21 [MeasuredProstateVolume] : 71g [TotalCores] : 13 [TotalPositiveCores] : 2 [MaxCoreInvolvement] : 60

## 2022-09-12 ENCOUNTER — NON-APPOINTMENT (OUTPATIENT)
Age: 82
End: 2022-09-12

## 2022-09-12 NOTE — DISEASE MANAGEMENT
[IIC] : IIC [3] : T3 [a] : a [1] : N1 [0-10] : 0 -10 ng/mL [Biopsy with Fusion] : Patient had a biopsy with fusion on [8] : Fusion Biopsy Desi Score: 8 [] : Patient had a Prostate MRI [5] : 5 [de-identified] : 9918cMs [de-identified] : 7038cGy [de-identified] : PROSTATE/SV/LN [BiopsyDate] : 12/14/21 [MeasuredProstateVolume] : 71g [TotalCores] : 13 [TotalPositiveCores] : 2 [MaxCoreInvolvement] : 60

## 2022-09-12 NOTE — HISTORY OF PRESENT ILLNESS
[FreeTextEntry1] : 09/12/2022 - OTV - Today he has completed  3240 cGy / 7020 cGy and notes he is feeling well. He complains of bloating last night after drinking cabbage soup,  had discomfort overnight, states Gas-ex helped.Nocturia continues x 4-5  urinary frequency, urgency and weak stream.Denies dysuria, leakage or incontinence.  He denies blood in the urine.  He has no blood or mucous in the stool and denies rectal pain. He has regular bowel movement  x 2 per day which are well formed. Patient is tolerating radiation treatment well. Continue with radiation treatment as scheduled, follow-up next week as scheduled or earlier if issues develop.\par \par \par 09/06/2022 - OTV - Today he has completed  2160 cGy / 7020 cGy and notes he is feeling well.Continued on Orgovyx for the past 6 weeks, tolerating it well. He states his symptoms initially improved with Orgovyx but now experiencing nocturia x4, urinary frequency, urgency and weak stream. Denies dysuria, leakage or incontinence.  He denies blood in the urine.  He has no blood or mucous in the stool and denies rectal pain. He has regular bowel movement  x 2 per day which are well formed. Patient is tolerating radiation treatment well. Continue with radiation treatment as scheduled, follow-up next week as scheduled or earlier if issues develop.Seen by  on 8/31/2022.\par \par 08/29/2022 - FIRST OTV - Today he has completed  810 cGy out of 7020 cGy and notes he is feeling well. Pt c/o no appreciable symptomatology related to his definitive radiation therapy.  Specifically, he notes baseline urine function with nocturia x 3, while not requiring alpha blocker medication. Patient on Orgovyx since 4/2022 and states he usually has a good stream but the stream is not so strong after starting RT. He also complains of urinary frequency.  patient encouraged to decrease his fluid intake at night.He denies urinary urgency, dysuria, leakage or incontinence.  He denies blood in the urine.  He has no blood or mucous in the stool and denies rectal pain. He has regular bowel movement  x 2 per day which are well formed. Patient is tolerating radiation treatment well. Continue with radiation treatment as scheduled, follow-up next week as scheduled or earlier if issues develop. Scheduled to see  on 8/31/22 for latest PSA level.\par \par Mr. Oscar Reyna is an 81 year-old male referred by Dr. 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  He presents today to again review his definitive treatment options.\par \par Oncologic History:\par He has a had a rising PSA as follows:\par 6/3/19 - 6.52 ng/ml\par 12/9/19 - 6.63 ng/ml\par 8/17/20 - 7.05 ng/ml\par 3/22/21 - 7.81 ng/ml\par 9/20/21 - 9.90 ng/ml\par 2/07/22 - 11.40 ng/ml\par STARTED ORGOVYX ON 4/2022\par 5/23/22 - 2.95 ng/ml\par \par 10/7/21 MRI Prostate ([facility]): \par - Volume 61 cc\par - Lesion in the lateral/posterolateral right peripheral zone at the mid gland/apex with evidence of extracapsular extension - PI-RADS 5\par - No seminal vesicle invasion, pelvic lymphadenopathy, or bony metastases.\par \par 12/14/21 MRI fusion biopsy \par Final Diagnosis \par 1. Prostate, left anterior apex, biopsy\par -Benign prostate tissue.\par 2. Prostate, left anterior base, biopsy\par -Benign prostate tissue.\par 3. Prostate, right anterior apex, biopsy\par -Benign fibromuscular tissue.\par -Prostatic glands are not identified.\par 4. Prostate, right anterior base, biopsy\par -Benign prostate tissue.\par 5. Prostate, midline apex, biopsy\par -Benign prostate tissue.\par 6. Prostate, midline base, biopsy\par -Benign prostate tissue.\par 7. Prostate, left posterior apex, biopsy\par -Benign prostate tissue.\par 8. Prostate, left posterior base, biopsy\par -Benign prostate tissue.\par 9. Prostate, right posterior apex, biopsy\par -Benign prostate tissue.\par 10. Prostate, right posterior base, biopsy\par -Adenocarcinoma of the prostate, Prognostic Grade Group 4 ([place]\par score 4+4=8) involving 60% (7 mm in length) of 1 of 1 core(s).\par 11. Prostate, left lateral, biopsy\par -Benign prostate tissue.\par 12. Prostate, right lateral, biopsy\par -Benign prostate tissue.\par 13. Prostate, right PZ, biopsy\par -Adenocarcinoma of the prostate, Prognostic Grade Group 4 ([place]\par score 4+4=8) involving 60%, 50% and 40% (6 mm, 5.5 mm and 5 mm in length)\par of 3 of 3 core(s).\par Note: Cribriform [place] pattern 4 is present.\par \par 1/25/22- Whole Body Bone Scan\par IMPRESSION:\par \par 1. Abnormal uptake diffusely involving the L4 vertebral body is nonspecific and warrants anatomic correlation with either radiographic or CT imaging. \par \par 2. Likely degenerative changes in the rest of the spine and extremities, which could be also be confirmed with anatomic imaging.\par \par 3. Presumed periodontal inflammatory changes in the horizontal ramus of the right hemimandible. Correlation with dedicated dental evaluation is suggested.\par \par \par 2/7/22- PSA 11.4 ng/mL\par \par \par 3/2/22- PET/ CT PSMA\par Impression:\par 1. Since 10/7/2021, the prostate is again enlarged and contains a focus of pilflufolastat avidity in the right (identified on prior MR prostate) posterior transitional zone toward the apex, consistent with known prostate malignancy.\par \par 2. There is a 0.9 cm intensely pilflufolastat avid (SUV 24.6) perirectal lymph node and 0.5 mm mildly avid lymph node slightly superiorly, consistent with local metastasis.\par \par 3. 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  Unfortunately this possibility cannot be confirmed on the accompanying non-contrast CT and no definite adenopathy is present.\par \par Therefore, a contrast CT of the abdomen and pelvis is suggested.\par \par 3/21/22-  F/U with Dr. Fleming and started on Orgovyx\par \par 5/23/22- PSA 2.95 ng/mL w/ Testosterone 5.9 ng/dL\par \par 6/3/22- Colonoscopy- \par \par Today he presents for consideration of radiation therapy to the prostate, referred by Dr. Fleming. He denies a history of radiation.  He notes moderate baseline urinary function, with frequency, urgency, nocturia x2, and strong urine stream since he started Orgovyx.  He does take Saw Palmetto.  He denies a history of baseline hemorrhoids.  His last colonoscopy was 6/3/22 with no polyps removed and diverticulosis of large intestine. He notes he is unable to have erections at this time. He declines sperm banking. \par

## 2022-09-19 ENCOUNTER — NON-APPOINTMENT (OUTPATIENT)
Age: 82
End: 2022-09-19

## 2022-09-19 NOTE — DISEASE MANAGEMENT
[IIC] : IIC [3] : T3 [a] : a [1] : N1 [0-10] : 0 -10 ng/mL [Biopsy with Fusion] : Patient had a biopsy with fusion on [8] : Fusion Biopsy Desi Score: 8 [] : Patient had a Prostate MRI [5] : 5 [de-identified] : 6671nYe [de-identified] : 7033cGy [de-identified] : PROSTATE/SV/LN [BiopsyDate] : 12/14/21 [MeasuredProstateVolume] : 71g [TotalCores] : 13 [TotalPositiveCores] : 2 [MaxCoreInvolvement] : 60

## 2022-09-19 NOTE — HISTORY OF PRESENT ILLNESS
[FreeTextEntry1] : 09/19/2022 - OTV - Today he has completed  4590 cGy / 7020 cGy and notes he is feeling well.He denies any bloating as he has stopped eating cruciferous vegetables. Continues to have nocturia x 4-5  urinary frequency, urgency, weak stream. intermittent urination, not on Flomax and refuses to take it..Denies dysuria, leakage or incontinence.  He denies blood in the urine.  He has no blood or mucous in the stool and denies rectal pain. He has regular bowel movement  x 2 per day which are well formed. Patient is tolerating radiation treatment well. Continue with radiation treatment as scheduled, follow-up next week as scheduled or earlier if issues develop.\par \par \par 09/12/2022 - OTV - Today he has completed  3240 cGy / 7020 cGy and notes he is feeling well. He complains of bloating last night after drinking cabbage soup,  had discomfort overnight, states Gas-ex helped.Nocturia continues x 4-5  urinary frequency, urgency and weak stream.Denies dysuria, leakage or incontinence.  He denies blood in the urine.  He has no blood or mucous in the stool and denies rectal pain. He has regular bowel movement  x 2 per day which are well formed. Patient is tolerating radiation treatment well. Continue with radiation treatment as scheduled, follow-up next week as scheduled or earlier if issues develop.\par \par \par 09/06/2022 - OTV - Today he has completed  2160 cGy / 7020 cGy and notes he is feeling well.Continued on Orgovyx for the past 6 weeks, tolerating it well. He states his symptoms initially improved with Orgovyx but now experiencing nocturia x4, urinary frequency, urgency and weak stream. Denies dysuria, leakage or incontinence.  He denies blood in the urine.  He has no blood or mucous in the stool and denies rectal pain. He has regular bowel movement  x 2 per day which are well formed. Patient is tolerating radiation treatment well. Continue with radiation treatment as scheduled, follow-up next week as scheduled or earlier if issues develop.Seen by  on 8/31/2022.\par \par 08/29/2022 - FIRST OTV - Today he has completed  810 cGy out of 7020 cGy and notes he is feeling well. Pt c/o no appreciable symptomatology related to his definitive radiation therapy.  Specifically, he notes baseline urine function with nocturia x 3, while not requiring alpha blocker medication. Patient on Orgovyx since 4/2022 and states he usually has a good stream but the stream is not so strong after starting RT. He also complains of urinary frequency.  patient encouraged to decrease his fluid intake at night.He denies urinary urgency, dysuria, leakage or incontinence.  He denies blood in the urine.  He has no blood or mucous in the stool and denies rectal pain. He has regular bowel movement  x 2 per day which are well formed. Patient is tolerating radiation treatment well. Continue with radiation treatment as scheduled, follow-up next week as scheduled or earlier if issues develop. Scheduled to see  on 8/31/22 for latest PSA level.\par \par Mr. Oscar Reyna is an 81 year-old male referred by Dr. 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  He presents today to again review his definitive treatment options.\par \par Oncologic History:\par He has a had a rising PSA as follows:\par 6/3/19 - 6.52 ng/ml\par 12/9/19 - 6.63 ng/ml\par 8/17/20 - 7.05 ng/ml\par 3/22/21 - 7.81 ng/ml\par 9/20/21 - 9.90 ng/ml\par 2/07/22 - 11.40 ng/ml\par STARTED ORGOVYX ON 4/2022\par 5/23/22 - 2.95 ng/ml\par \par 10/7/21 MRI Prostate ([facility]): \par - Volume 61 cc\par - Lesion in the lateral/posterolateral right peripheral zone at the mid gland/apex with evidence of extracapsular extension - PI-RADS 5\par - No seminal vesicle invasion, pelvic lymphadenopathy, or bony metastases.\par \par 12/14/21 MRI fusion biopsy \par Final Diagnosis \par 1. Prostate, left anterior apex, biopsy\par -Benign prostate tissue.\par 2. Prostate, left anterior base, biopsy\par -Benign prostate tissue.\par 3. Prostate, right anterior apex, biopsy\par -Benign fibromuscular tissue.\par -Prostatic glands are not identified.\par 4. Prostate, right anterior base, biopsy\par -Benign prostate tissue.\par 5. Prostate, midline apex, biopsy\par -Benign prostate tissue.\par 6. Prostate, midline base, biopsy\par -Benign prostate tissue.\par 7. Prostate, left posterior apex, biopsy\par -Benign prostate tissue.\par 8. Prostate, left posterior base, biopsy\par -Benign prostate tissue.\par 9. Prostate, right posterior apex, biopsy\par -Benign prostate tissue.\par 10. Prostate, right posterior base, biopsy\par -Adenocarcinoma of the prostate, Prognostic Grade Group 4 ([place]\par score 4+4=8) involving 60% (7 mm in length) of 1 of 1 core(s).\par 11. Prostate, left lateral, biopsy\par -Benign prostate tissue.\par 12. Prostate, right lateral, biopsy\par -Benign prostate tissue.\par 13. Prostate, right PZ, biopsy\par -Adenocarcinoma of the prostate, Prognostic Grade Group 4 ([place]\par score 4+4=8) involving 60%, 50% and 40% (6 mm, 5.5 mm and 5 mm in length)\par of 3 of 3 core(s).\par Note: Cribriform [place] pattern 4 is present.\par \par 1/25/22- Whole Body Bone Scan\par IMPRESSION:\par \par 1. Abnormal uptake diffusely involving the L4 vertebral body is nonspecific and warrants anatomic correlation with either radiographic or CT imaging. \par \par 2. Likely degenerative changes in the rest of the spine and extremities, which could be also be confirmed with anatomic imaging.\par \par 3. Presumed periodontal inflammatory changes in the horizontal ramus of the right hemimandible. Correlation with dedicated dental evaluation is suggested.\par \par \par 2/7/22- PSA 11.4 ng/mL\par \par \par 3/2/22- PET/ CT PSMA\par Impression:\par 1. Since 10/7/2021, the prostate is again enlarged and contains a focus of pilflufolastat avidity in the right (identified on prior MR prostate) posterior transitional zone toward the apex, consistent with known prostate malignancy.\par \par 2. There is a 0.9 cm intensely pilflufolastat avid (SUV 24.6) perirectal lymph node and 0.5 mm mildly avid lymph node slightly superiorly, consistent with local metastasis.\par \par 3. 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  Unfortunately this possibility cannot be confirmed on the accompanying non-contrast CT and no definite adenopathy is present.\par \par Therefore, a contrast CT of the abdomen and pelvis is suggested.\par \par 3/21/22-  F/U with Dr. Fleming and started on Orgovyx\par \par 5/23/22- PSA 2.95 ng/mL w/ Testosterone 5.9 ng/dL\par \par 6/3/22- Colonoscopy- \par \par Today he presents for consideration of radiation therapy to the prostate, referred by Dr. Fleming. He denies a history of radiation.  He notes moderate baseline urinary function, with frequency, urgency, nocturia x2, and strong urine stream since he started Orgovyx.  He does take Saw Palmetto.  He denies a history of baseline hemorrhoids.  His last colonoscopy was 6/3/22 with no polyps removed and diverticulosis of large intestine. He notes he is unable to have erections at this time. He declines sperm banking. \par

## 2022-09-26 ENCOUNTER — NON-APPOINTMENT (OUTPATIENT)
Age: 82
End: 2022-09-26

## 2022-09-28 ENCOUNTER — NON-APPOINTMENT (OUTPATIENT)
Age: 82
End: 2022-09-28

## 2022-09-29 NOTE — HISTORY OF PRESENT ILLNESS
[FreeTextEntry1] : 09/26/2022 - OTV - Today he has completed  5940 cGy / 7020 cGy and notes he is feeling well.  Continues to have nocturia x 4-5  urinary frequency, urgency, weak stream. intermittent urination, not on Flomax and refuses to take it..Denies dysuria, leakage or incontinence.  He denies blood in the urine.  He has no blood or mucous in the stool and denies rectal pain. He has regular bowel movement  x 2 per day which are well formed. Patient is tolerating radiation treatment well. Continue with radiation treatment as scheduled, follow-up next week as scheduled or earlier if issues develop.Referral sent to [facility] as requested by patient.\par \par \par 09/19/2022 - OTV - Today he has completed  4590 cGy / 7020 cGy and notes he is feeling well.He denies any bloating as he has stopped eating cruciferous vegetables. Continues to have nocturia x 4-5  urinary frequency, urgency, weak stream. intermittent urination, not on Flomax and refuses to take it..Denies dysuria, leakage or incontinence.  He denies blood in the urine.  He has no blood or mucous in the stool and denies rectal pain. He has regular bowel movement  x 2 per day which are well formed. Patient is tolerating radiation treatment well. Continue with radiation treatment as scheduled, follow-up next week as scheduled or earlier if issues develop.\par \par \par 09/12/2022 - OTV - Today he has completed  3240 cGy / 7020 cGy and notes he is feeling well. He complains of bloating last night after drinking cabbage soup,  had discomfort overnight, states Gas-ex helped.Nocturia continues x 4-5  urinary frequency, urgency and weak stream.Denies dysuria, leakage or incontinence.  He denies blood in the urine.  He has no blood or mucous in the stool and denies rectal pain. He has regular bowel movement  x 2 per day which are well formed. Patient is tolerating radiation treatment well. Continue with radiation treatment as scheduled, follow-up next week as scheduled or earlier if issues develop.\par \par \par 09/06/2022 - OTV - Today he has completed  2160 cGy / 7020 cGy and notes he is feeling well.Continued on Orgovyx for the past 6 weeks, tolerating it well. He states his symptoms initially improved with Orgovyx but now experiencing nocturia x4, urinary frequency, urgency and weak stream. Denies dysuria, leakage or incontinence.  He denies blood in the urine.  He has no blood or mucous in the stool and denies rectal pain. He has regular bowel movement  x 2 per day which are well formed. Patient is tolerating radiation treatment well. Continue with radiation treatment as scheduled, follow-up next week as scheduled or earlier if issues develop.Seen by  on 8/31/2022.\par \par 08/29/2022 - FIRST OTV - Today he has completed  810 cGy out of 7020 cGy and notes he is feeling well. Pt c/o no appreciable symptomatology related to his definitive radiation therapy.  Specifically, he notes baseline urine function with nocturia x 3, while not requiring alpha blocker medication. Patient on Orgovyx since 4/2022 and states he usually has a good stream but the stream is not so strong after starting RT. He also complains of urinary frequency.  patient encouraged to decrease his fluid intake at night.He denies urinary urgency, dysuria, leakage or incontinence.  He denies blood in the urine.  He has no blood or mucous in the stool and denies rectal pain. He has regular bowel movement  x 2 per day which are well formed. Patient is tolerating radiation treatment well. Continue with radiation treatment as scheduled, follow-up next week as scheduled or earlier if issues develop. Scheduled to see  on 8/31/22 for latest PSA level.\par \par Mr. Oscar Reyna is an 81 year-old male referred by Dr. 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  He presents today to again review his definitive treatment options.\par \par Oncologic History:\par He has a had a rising PSA as follows:\par 6/3/19 - 6.52 ng/ml\par 12/9/19 - 6.63 ng/ml\par 8/17/20 - 7.05 ng/ml\par 3/22/21 - 7.81 ng/ml\par 9/20/21 - 9.90 ng/ml\par 2/07/22 - 11.40 ng/ml\par STARTED ORGOVYX ON 4/2022\par 5/23/22 - 2.95 ng/ml\par \par 10/7/21 MRI Prostate ([facility]): \par - Volume 61 cc\par - Lesion in the lateral/posterolateral right peripheral zone at the mid gland/apex with evidence of extracapsular extension - PI-RADS 5\par - No seminal vesicle invasion, pelvic lymphadenopathy, or bony metastases.\par \par 12/14/21 MRI fusion biopsy \par Final Diagnosis \par 1. Prostate, left anterior apex, biopsy\par -Benign prostate tissue.\par 2. Prostate, left anterior base, biopsy\par -Benign prostate tissue.\par 3. Prostate, right anterior apex, biopsy\par -Benign fibromuscular tissue.\par -Prostatic glands are not identified.\par 4. Prostate, right anterior base, biopsy\par -Benign prostate tissue.\par 5. Prostate, midline apex, biopsy\par -Benign prostate tissue.\par 6. Prostate, midline base, biopsy\par -Benign prostate tissue.\par 7. Prostate, left posterior apex, biopsy\par -Benign prostate tissue.\par 8. Prostate, left posterior base, biopsy\par -Benign prostate tissue.\par 9. Prostate, right posterior apex, biopsy\par -Benign prostate tissue.\par 10. Prostate, right posterior base, biopsy\par -Adenocarcinoma of the prostate, Prognostic Grade Group 4 ([place]i\par score 4+4=8) involving 60% (7 mm in length) of 1 of 1 core(s).\par 11. Prostate, left lateral, biopsy\par -Benign prostate tissue.\par 12. Prostate, right lateral, biopsy\par -Benign prostate tissue.\par 13. Prostate, right PZ, biopsy\par -Adenocarcinoma of the prostate, Prognostic Grade Group 4 (Desi\par score 4+4=8) involving 60%, 50% and 40% (6 mm, 5.5 mm and 5 mm in length)\par of 3 of 3 core(s).\par Note: Cribriform Desi pattern 4 is present.\par \par 1/25/22- Whole Body Bone Scan\par IMPRESSION:\par \par 1. Abnormal uptake diffusely involving the L4 vertebral body is nonspecific and warrants anatomic correlation with either radiographic or CT imaging. \par \par 2. Likely degenerative changes in the rest of the spine and extremities, which could be also be confirmed with anatomic imaging.\par \par 3. Presumed periodontal inflammatory changes in the horizontal ramus of the right hemimandible. Correlation with dedicated dental evaluation is suggested.\par \par \par 2/7/22- PSA 11.4 ng/mL\par \par \par 3/2/22- PET/ CT PSMA\par Impression:\par 1. Since 10/7/2021, the prostate is again enlarged and contains a focus of pilflufolastat avidity in the right (identified on prior MR prostate) posterior transitional zone toward the apex, consistent with known prostate malignancy.\par \par 2. There is a 0.9 cm intensely pilflufolastat avid (SUV 24.6) perirectal lymph node and 0.5 mm mildly avid lymph node slightly superiorly, consistent with local metastasis.\par \par 3. 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  Unfortunately this possibility cannot be confirmed on the accompanying non-contrast CT and no definite adenopathy is present.\par \par Therefore, a contrast CT of the abdomen and pelvis is suggested.\par \par 3/21/22-  F/U with Dr. Fleming and started on Orgovyx\par \par 5/23/22- PSA 2.95 ng/mL w/ Testosterone 5.9 ng/dL\par \par 6/3/22- Colonoscopy- \par \par Today he presents for consideration of radiation therapy to the prostate, referred by Dr. Fleming. He denies a history of radiation.  He notes moderate baseline urinary function, with frequency, urgency, nocturia x2, and strong urine stream since he started Orgovyx.  He does take Saw Palmetto.  He denies a history of baseline hemorrhoids.  His last colonoscopy was 6/3/22 with no polyps removed and diverticulosis of large intestine. He notes he is unable to have erections at this time. He declines sperm banking. \par

## 2022-09-29 NOTE — DISEASE MANAGEMENT
[Pathological] : TNM Stage: p [TTNM] : 3a [NTNM] : 1 [MTNM] : 0 [de-identified] : 5940cGy [de-identified] : 7060cGy [de-identified] : PROSTATE/SV/LN [BiopsyDate] : 12/14/21 [MeasuredProstateVolume] : 71g [TotalCores] : 13 [TotalPositiveCores] : 2 [MaxCoreInvolvement] : 60

## 2022-10-03 ENCOUNTER — NON-APPOINTMENT (OUTPATIENT)
Age: 82
End: 2022-10-03

## 2022-10-03 NOTE — HISTORY OF PRESENT ILLNESS
[FreeTextEntry1] : 10/03/2022 - FINAL OTV - Today he has completed  6750 cGy / 7020 cGy and notes he is feeling well. Continues to have nocturia x 4-5  urinary frequency, urgency, weak stream. intermittent urination, not on Flomax and refuses to take it..Denies dysuria, leakage or incontinence.  He denies blood in the urine.  He has no blood or mucous in the stool and denies rectal pain. He has regular bowel movement  x 2 per day which are well formed. Patient is tolerating radiation treatment well. \par \par 09/26/2022 - OTV - Today he has completed  5940 cGy / 7020 cGy and notes he is feeling well.  Continues to have nocturia x 4-5  urinary frequency, urgency, weak stream. intermittent urination, not on Flomax and refuses to take it..Denies dysuria, leakage or incontinence.  He denies blood in the urine.  He has no blood or mucous in the stool and denies rectal pain. He has regular bowel movement  x 2 per day which are well formed. Patient is tolerating radiation treatment well. Continue with radiation treatment as scheduled, follow-up next week as scheduled or earlier if issues develop.Referral sent to [facility] dietician as requested by patient.\par \par \par 09/19/2022 - OTV - Today he has completed  4590 cGy / 7020 cGy and notes he is feeling well.He denies any bloating as he has stopped eating cruciferous vegetables. Continues to have nocturia x 4-5  urinary frequency, urgency, weak stream. intermittent urination, not on Flomax and refuses to take it..Denies dysuria, leakage or incontinence.  He denies blood in the urine.  He has no blood or mucous in the stool and denies rectal pain. He has regular bowel movement  x 2 per day which are well formed. Patient is tolerating radiation treatment well. Continue with radiation treatment as scheduled, follow-up next week as scheduled or earlier if issues develop.\par \par \par 09/12/2022 - OTV - Today he has completed  3240 cGy / 7020 cGy and notes he is feeling well. He complains of bloating last night after drinking cabbage soup,  had discomfort overnight, states Gas-ex helped.Nocturia continues x 4-5  urinary frequency, urgency and weak stream.Denies dysuria, leakage or incontinence.  He denies blood in the urine.  He has no blood or mucous in the stool and denies rectal pain. He has regular bowel movement  x 2 per day which are well formed. Patient is tolerating radiation treatment well. Continue with radiation treatment as scheduled, follow-up next week as scheduled or earlier if issues develop.\par \par \par 09/06/2022 - OTV - Today he has completed  2160 cGy / 7020 cGy and notes he is feeling well.Continued on Orgovyx for the past 6 weeks, tolerating it well. He states his symptoms initially improved with Orgovyx but now experiencing nocturia x4, urinary frequency, urgency and weak stream. Denies dysuria, leakage or incontinence.  He denies blood in the urine.  He has no blood or mucous in the stool and denies rectal pain. He has regular bowel movement  x 2 per day which are well formed. Patient is tolerating radiation treatment well. Continue with radiation treatment as scheduled, follow-up next week as scheduled or earlier if issues develop.Seen by  on 8/31/2022.\par \par 08/29/2022 - FIRST OTV - Today he has completed  810 cGy out of 7020 cGy and notes he is feeling well. Pt c/o no appreciable symptomatology related to his definitive radiation therapy.  Specifically, he notes baseline urine function with nocturia x 3, while not requiring alpha blocker medication. Patient on Orgovyx since 4/2022 and states he usually has a good stream but the stream is not so strong after starting RT. He also complains of urinary frequency.  patient encouraged to decrease his fluid intake at night.He denies urinary urgency, dysuria, leakage or incontinence.  He denies blood in the urine.  He has no blood or mucous in the stool and denies rectal pain. He has regular bowel movement  x 2 per day which are well formed. Patient is tolerating radiation treatment well. Continue with radiation treatment as scheduled, follow-up next week as scheduled or earlier if issues develop. Scheduled to see  on 8/31/22 for latest PSA level.\par \par Mr. Oscar Reyna is an 81 year-old male referred by Dr. 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  He presents today to again review his definitive treatment options.\par \par Oncologic History:\par He has a had a rising PSA as follows:\par 6/3/19 - 6.52 ng/ml\par 12/9/19 - 6.63 ng/ml\par 8/17/20 - 7.05 ng/ml\par 3/22/21 - 7.81 ng/ml\par 9/20/21 - 9.90 ng/ml\par 2/07/22 - 11.40 ng/ml\par STARTED ORGOVYX ON 4/2022\par 5/23/22 - 2.95 ng/ml\par \par 10/7/21 MRI Prostate ([facility]): \par - Volume 61 cc\par - Lesion in the lateral/posterolateral right peripheral zone at the mid gland/apex with evidence of extracapsular extension - PI-RADS 5\par - No seminal vesicle invasion, pelvic lymphadenopathy, or bony metastases.\par \par 12/14/21 MRI fusion biopsy \par Final Diagnosis \par 1. Prostate, left anterior apex, biopsy\par -Benign prostate tissue.\par 2. Prostate, left anterior base, biopsy\par -Benign prostate tissue.\par 3. Prostate, right anterior apex, biopsy\par -Benign fibromuscular tissue.\par -Prostatic glands are not identified.\par 4. Prostate, right anterior base, biopsy\par -Benign prostate tissue.\par 5. Prostate, midline apex, biopsy\par -Benign prostate tissue.\par 6. Prostate, midline base, biopsy\par -Benign prostate tissue.\par 7. Prostate, left posterior apex, biopsy\par -Benign prostate tissue.\par 8. Prostate, left posterior base, biopsy\par -Benign prostate tissue.\par 9. Prostate, right posterior apex, biopsy\par -Benign prostate tissue.\par 10. Prostate, right posterior base, biopsy\par -Adenocarcinoma of the prostate, Prognostic Grade Group 4 (Desi\par score 4+4=8) involving 60% (7 mm in length) of 1 of 1 core(s).\par 11. Prostate, left lateral, biopsy\par -Benign prostate tissue.\par 12. Prostate, right lateral, biopsy\par -Benign prostate tissue.\par 13. Prostate, right PZ, biopsy\par -Adenocarcinoma of the prostate, Prognostic Grade Group 4 ([place]\par score 4+4=8) involving 60%, 50% and 40% (6 mm, 5.5 mm and 5 mm in length)\par of 3 of 3 core(s).\par Note: Cribriform [place] pattern 4 is present.\par \par 1/25/22- Whole Body Bone Scan\par IMPRESSION:\par \par 1. Abnormal uptake diffusely involving the L4 vertebral body is nonspecific and warrants anatomic correlation with either radiographic or CT imaging. \par \par 2. Likely degenerative changes in the rest of the spine and extremities, which could be also be confirmed with anatomic imaging.\par \par 3. Presumed periodontal inflammatory changes in the horizontal ramus of the right hemimandible. Correlation with dedicated dental evaluation is suggested.\par \par \par 2/7/22- PSA 11.4 ng/mL\par \par \par 3/2/22- PET/ CT PSMA\par Impression:\par 1. Since 10/7/2021, the prostate is again enlarged and contains a focus of pilflufolastat avidity in the right (identified on prior MR prostate) posterior transitional zone toward the apex, consistent with known prostate malignancy.\par \par 2. There is a 0.9 cm intensely pilflufolastat avid (SUV 24.6) perirectal lymph node and 0.5 mm mildly avid lymph node slightly superiorly, consistent with local metastasis.\par \par 3. 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  Unfortunately this possibility cannot be confirmed on the accompanying non-contrast CT and no definite adenopathy is present.\par \par Therefore, a contrast CT of the abdomen and pelvis is suggested.\par \par 3/21/22-  F/U with Dr. Fleming and started on Orgovyx\par \par 5/23/22- PSA 2.95 ng/mL w/ Testosterone 5.9 ng/dL\par \par 6/3/22- Colonoscopy- \par \par Today he presents for consideration of radiation therapy to the prostate, referred by Dr. Fleming. He denies a history of radiation.  He notes moderate baseline urinary function, with frequency, urgency, nocturia x2, and strong urine stream since he started Orgovyx.  He does take Saw Palmetto.  He denies a history of baseline hemorrhoids.  His last colonoscopy was 6/3/22 with no polyps removed and diverticulosis of large intestine. He notes he is unable to have erections at this time. He declines sperm banking. \par

## 2022-10-03 NOTE — DISEASE MANAGEMENT
[Pathological] : TNM Stage: p [IIC] : IIC [3] : T3 [a] : a [1] : N1 [0-10] : 0 -10 ng/mL [Biopsy with Fusion] : Patient had a biopsy with fusion on [8] : Fusion Biopsy Desi Score: 8 [] : Patient had a Prostate MRI [5] : 5 [TTNM] : 3a [NTNM] : 1 [MTNM] : 0 [de-identified] : 6750cGy [de-identified] : 7046cGy [de-identified] : PROSTATE/SV/LN [BiopsyDate] : 12/14/21 [MeasuredProstateVolume] : 71g [TotalCores] : 13 [TotalPositiveCores] : 2 [MaxCoreInvolvement] : 60

## 2022-12-08 ENCOUNTER — APPOINTMENT (OUTPATIENT)
Dept: RADIATION ONCOLOGY | Facility: CLINIC | Age: 82
End: 2022-12-08

## 2022-12-08 VITALS
WEIGHT: 153 LBS | BODY MASS INDEX: 21.9 KG/M2 | RESPIRATION RATE: 16 BRPM | HEART RATE: 68 BPM | DIASTOLIC BLOOD PRESSURE: 72 MMHG | SYSTOLIC BLOOD PRESSURE: 117 MMHG | TEMPERATURE: 97.8 F | HEIGHT: 70 IN | OXYGEN SATURATION: 100 %

## 2022-12-08 PROCEDURE — 99024 POSTOP FOLLOW-UP VISIT: CPT

## 2022-12-08 NOTE — REVIEW OF SYSTEMS
[Nocturia] : nocturia [Urinary Frequency] : urinary frequency [Negative] : Endocrine [Constipation: Grade 0] : Constipation: Grade 0 [Diarrhea: Grade 0] : Diarrhea: Grade 0 [Rectal Pain: Grade 0] : Rectal Pain: Grade 0 [Hematuria: Grade 0] : Hematuria: Grade 0 [Urinary Incontinence: Grade 1 - Occasional (e.g., with coughing, sneezing, etc.), pads not indicated] : Urinary Incontinence: Grade 1 - Occasional (e.g., with coughing, sneezing, etc.), pads not indicated [Urinary Retention: Grade 0] : Urinary Retention: Grade 0 [Urinary Tract Pain: Grade 0] : Urinary Tract Pain: Grade 0 [Urinary Urgency: Grade 1 - Present] : Urinary Urgency: Grade 1 - Present [Urinary Frequency: Grade 1 - Present] : Urinary Frequency: Grade 1 - Present [Ejaculation Disorder: Grade 0] : Ejaculation Disorder: Grade 0 [Erectile Dysfunction: Grade 1 - Decrease in erectile function (frequency or rigidity of erections) but intervention not indicated (e.g., medication or use of mechanical device, penile pump)] : Erectile Dysfunction: Grade 1 - Decrease in erectile function (frequency or rigidity of erections) but intervention not indicated (e.g., medication or use of mechanical device, penile pump)

## 2022-12-08 NOTE — DISEASE MANAGEMENT
[0-10] : 0 -10 ng/mL [5] : 5 [Biopsy with Fusion] : Patient had a biopsy with fusion on [8] : Fusion Biopsy Desi Score: 8 [3] : T3 [a] : a [1] : N1 [0] : M0 [IIC] : IIC [] : Patient had no CT scan performed [BiopsyDate] : 12/14/2021 [MeasuredProstateVolume] : 61 [TotalCores] : 15 [TotalPositiveCores] : 4 [MaxCoreInvolvement] : 60% [BoneScanResults] : Shows equivocal bone lesion in L4.Presumed peridontal inflammatory changes in the horizontal ramus of the right hemimandible.

## 2022-12-08 NOTE — VITALS
[Maximal Pain Intensity: 0/10] : 0/10 [NoTreatment Scheduled] : no treatment scheduled [90: Able to carry normal activity; minor signs or symptoms of disease.] : 90: Able to carry normal activity; minor signs or symptoms of disease.  [ECOG Performance Status: 1 - Restricted in physically strenuous activity but ambulatory and able to carry out work of a light or sedentary nature] : Performance Status: 1 - Restricted in physically strenuous activity but ambulatory and able to carry out work of a light or sedentary nature, e.g., light house work, office work

## 2022-12-08 NOTE — HISTORY OF PRESENT ILLNESS
[FreeTextEntry1] : Mr. Oscar Reyna is a 82 year old male with a diagnosis of favorable/ unfavorable intermediate risk prostate adenocarcinoma, pretreatment PSA: 9.9 ng/mL,  [place] Score : 8 (4+4), 4/15 cores positive with 60% involvement , cT1c (T3a on MRI). Bone scan shows equivocal bone lesion in L4.\par He was initially seen by  on 01/18/2022. He was treated to Prostate/SV/LN with 7020cGy ___________ He tolerated RT well without developing any Grade 3 or higher acute toxicity as a result of his treatment and the KPS remained stable during the course of radiation treatment.\par On Orgovyx for 6 weeks. SEE recent PET/CT PSMA scan results below.\par \par \par \par (Urologist: )\par \par PSA Trend:\par 06/03/2019: 6.52 ng/mL\par 12/09/2019: 6.63 ng/mL\par 08/17/2020: 7.05 ng/mL\par 03/22/2021: 7.81 ng/mL\par 09/20/2021: 9.90 ng/mL\par 02/07/2022:11.40 ng/mL\par 05/23/2022: 2.95 ng/mL\par \par Patient is here for post treatment evaluation. Feeling well overall. Notes improvement in urinary stream, nocturia (only getting up once or twice), and regularity of bowel movements. Has some urinary frequency, incontinence, and urgency but not worsening. Continues on Orgovyx and tolerating well. He is not sexually active at this time. \par \par 03/21/2022: PET/CT PSMA on 03/21/2022 shows prostate again enlarged in right posterior transitional zone towards apex consistent with known malignancy. Also there is a 0.9 cm intensely pilflufolastat avid (SUV 24.6) perirectal LN ad 0.5 mm mildly avid LN slightly superiorly, consistent with local metastasis. \par \par

## 2022-12-09 LAB — PSA SERPL-MCNC: 0.03 NG/ML

## 2022-12-19 ENCOUNTER — APPOINTMENT (OUTPATIENT)
Dept: UROLOGY | Facility: CLINIC | Age: 82
End: 2022-12-19

## 2022-12-19 VITALS — TEMPERATURE: 97.9 F | SYSTOLIC BLOOD PRESSURE: 122 MMHG | DIASTOLIC BLOOD PRESSURE: 83 MMHG | HEART RATE: 73 BPM

## 2022-12-19 PROCEDURE — 99213 OFFICE O/P EST LOW 20 MIN: CPT

## 2022-12-19 NOTE — HISTORY OF PRESENT ILLNESS
[Urinary Urgency] : urinary urgency [Urinary Frequency] : urinary frequency [Nocturia] : nocturia [None] : None [FreeTextEntry1] : This is a 79yo male here for evaluation of elevated PSA. PSA in 4/2019 was 6.2. Repeat level was 7.2 in 5/2019. No prior PSA levels available. No family history of PCa. AUASS = 15. \par \par 12/09/19 Here for f/u. Last PSA was 6.5. No voiding complaints. \par \par 8/17/20 Here for f/u. Last PSA was stable in 6-7 range. No significant voiding complaints. \par \par 3/22/21 Here for f/u. Last PSA was stable in 6-7 range. No significant voiding complaints. \par \par 9/20/21 Here for f/u. Last PSA = 7.8.  No significant voiding complaints. \par \par 12/22/21 Here for f/u. PSA in 9/2021 up to 9.9. MRI 10/2021 showed PI-RADS 5 lesion right peripheral zone\par S/p MRI fusion biopsy 12/14/21\par Path: [place] 4+4=8, GG4, 2/13 cores including MRI lesion and right peripheral zone \par \par 2/07/22 Here for f/u. Had bone scan which shows equivocal bone lesion in L4. No symptoms\par Met with Rad Onc. Interested in proceeding with radiation but interested in deferring ADT\par \par 3/21/22 Here for f/u to review PSMA PET\par PET results:\par 1. Since 10/7/2021, the prostate is again enlarged and contains a focus of pilflufolastat avidity in the right (identified on prior MR prostate) posterior transitional zone toward the apex, consistent with known prostate malignancy.\par \par 2. There is a 0.9 cm intensely pilflufolastat avid (SUV 24.6) perirectal lymph node and 0.5 mm mildly avid lymph node slightly superiorly, consistent with local metastasis.\par \par 3. 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  Unfortunately this possibility cannot be confirmed on the accompanying non-contrast CT and no definite adenopathy is present.\par \par 5/23/22 Here for f/u. Has been on Orgovyx for about 6 weeks. Doing well, minimal side effects. Has not started radiation yet. \par \par 8/31/22 Here for f/u. Currently undergoing radiation. Some LUTS including urgency, frequency, weak stream. Symptoms initially improved on Orgovyx but now worsening. Symptoms are moderate. \par \par 12/19/22 Here for f/u. Completed radiation treatment. Feeling well. PSA last week = 0.03. Labs done by PCP show mild anemia, Hgb =11.  [Dysuria] : no dysuria [Hematuria - Gross] : no gross hematuria

## 2022-12-19 NOTE — LETTER BODY
[Dear  ___] : Dear  [unfilled], [Courtesy Letter:] : I had the pleasure of seeing your patient, [unfilled], in my office today. [Please see my note below.] : Please see my note below. [Consult Closing:] : Thank you very much for allowing me to participate in the care of this patient.  If you have any questions, please do not hesitate to contact me. [Sincerely,] : Sincerely, [FreeTextEntry2] : Trey Lopez, \par [address]\[place] [address] [FreeTextEntry3] : Jay Fleming MD\par Urologic Oncology\par Department of Urology\par [facility]\par \par [employer] [facility] \par \par

## 2022-12-19 NOTE — ASSESSMENT
[FreeTextEntry1] : 83yo male cT1c, Desi 8, GG2, involving 2/13 cores, PSA 9.9\par Patient is considered high risk by NCCN guidelines. \par PSMA PET, no bone lesion but concerning small lymph nodes that are PET avid\par Completed EBRT\par 2 years of ADT is recommended. He is reluctant to complete 2 years and we will discuss in the future doing 12 - 18 months \par Continue Orgovyx which is associated with lower cardiovascular risks\par Tolerating Orgovyx well\par F/u with Rad Onc \par \par F/u 4 months

## 2023-04-17 ENCOUNTER — APPOINTMENT (OUTPATIENT)
Dept: UROLOGY | Facility: CLINIC | Age: 83
End: 2023-04-17
Payer: MEDICARE

## 2023-04-17 VITALS
BODY MASS INDEX: 21.9 KG/M2 | DIASTOLIC BLOOD PRESSURE: 72 MMHG | WEIGHT: 153 LBS | OXYGEN SATURATION: 95 % | SYSTOLIC BLOOD PRESSURE: 125 MMHG | TEMPERATURE: 97.6 F | HEART RATE: 66 BPM | HEIGHT: 70 IN

## 2023-04-17 PROCEDURE — 99214 OFFICE O/P EST MOD 30 MIN: CPT

## 2023-04-17 RX ORDER — RELUGOLIX 120 MG/1
120 TABLET, FILM COATED ORAL
Qty: 30 | Refills: 5 | Status: ACTIVE | COMMUNITY
Start: 2022-04-08 | End: 1900-01-01

## 2023-04-17 NOTE — ASSESSMENT
[FreeTextEntry1] : 81yo male cT1c, Desi 8, GG2, involving 2/13 cores, PSA 9.9\par Patient is considered high risk by NCCN guidelines. \par PSMA PET, no bone lesion but concerning small lymph nodes that are PET avid\par Completed EBRT\par 2 years of ADT was recommended. He is reluctant to complete 2 years but is amenable to 18 months \par Continue Orgovyx which is associated with lower cardiovascular risks\par Tolerating Orgovyx well\par Orgovyx renewed today\par F/u with Rad Onc \par PSA, testosterone ordered for today\par \par F/u 3 months

## 2023-04-17 NOTE — HISTORY OF PRESENT ILLNESS
[Urinary Urgency] : urinary urgency [Urinary Frequency] : urinary frequency [Nocturia] : nocturia [None] : None [FreeTextEntry1] : This is a 79yo male here for evaluation of elevated PSA. PSA in 4/2019 was 6.2. Repeat level was 7.2 in 5/2019. No prior PSA levels available. No family history of PCa. AUASS = 15. \par \par 12/09/19 Here for f/u. Last PSA was 6.5. No voiding complaints. \par \par 8/17/20 Here for f/u. Last PSA was stable in 6-7 range. No significant voiding complaints. \par \par 3/22/21 Here for f/u. Last PSA was stable in 6-7 range. No significant voiding complaints. \par \par 9/20/21 Here for f/u. Last PSA = 7.8.  No significant voiding complaints. \par \par 12/22/21 Here for f/u. PSA in 9/2021 up to 9.9. MRI 10/2021 showed PI-RADS 5 lesion right peripheral zone\par S/p MRI fusion biopsy 12/14/21\par Path: [place] 4+4=8, GG4, 2/13 cores including MRI lesion and right peripheral zone \par \par 2/07/22 Here for f/u. Had bone scan which shows equivocal bone lesion in L4. No symptoms\par Met with Rad Onc. Interested in proceeding with radiation but interested in deferring ADT\par \par 3/21/22 Here for f/u to review PSMA PET\par PET results:\par 1. Since 10/7/2021, the prostate is again enlarged and contains a focus of pilflufolastat avidity in the right (identified on prior MR prostate) posterior transitional zone toward the apex, consistent with known prostate malignancy.\par \par 2. There is a 0.9 cm intensely pilflufolastat avid (SUV 24.6) perirectal lymph node and 0.5 mm mildly avid lymph node slightly superiorly, consistent with local metastasis.\par \par 3. 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  Unfortunately this possibility cannot be confirmed on the accompanying non-contrast CT and no definite adenopathy is present.\par \par 5/23/22 Here for f/u. Has been on Orgovyx for about 6 weeks. Doing well, minimal side effects. Has not started radiation yet. \par \par 8/31/22 Here for f/u. Currently undergoing radiation. Some LUTS including urgency, frequency, weak stream. Symptoms initially improved on Orgovyx but now worsening. Symptoms are moderate. \par \par 12/19/22 Here for f/u. Completed radiation treatment. Feeling well. PSA last week = 0.03. Labs done by PCP show mild anemia, Hgb =11. \par \par 4/17/23 Here for f/u. Overall doing well, no significant complaints. PSA 12/2022 = 0.03. [Dysuria] : no dysuria [Hematuria - Gross] : no gross hematuria

## 2023-04-17 NOTE — LETTER BODY
[Dear  ___] : Dear  [unfilled], [Courtesy Letter:] : I had the pleasure of seeing your patient, [unfilled], in my office today. [Please see my note below.] : Please see my note below. [Consult Closing:] : Thank you very much for allowing me to participate in the care of this patient.  If you have any questions, please do not hesitate to contact me. [Sincerely,] : Sincerely, [FreeTextEntry2] : Trey Lopez, \par [address]\[place] [address] [FreeTextEntry3] : Jay Fleming MD\par Urologic Oncology\par Department of Urology\par [facility]\par \par [employer] [facility] \par \par

## 2023-04-18 ENCOUNTER — NON-APPOINTMENT (OUTPATIENT)
Age: 83
End: 2023-04-18

## 2023-04-18 LAB
PSA SERPL-MCNC: <0.01 NG/ML
TESTOST SERPL-MCNC: 7.7 NG/DL

## 2023-05-14 ENCOUNTER — FORM ENCOUNTER (OUTPATIENT)
Age: 83
End: 2023-05-14

## 2023-05-16 ENCOUNTER — APPOINTMENT (OUTPATIENT)
Dept: RADIATION ONCOLOGY | Facility: CLINIC | Age: 83
End: 2023-05-16
Payer: MEDICARE

## 2023-05-16 VITALS
BODY MASS INDEX: 22.62 KG/M2 | TEMPERATURE: 97.8 F | OXYGEN SATURATION: 100 % | RESPIRATION RATE: 18 BRPM | HEART RATE: 65 BPM | WEIGHT: 158 LBS | HEIGHT: 70 IN | DIASTOLIC BLOOD PRESSURE: 78 MMHG | SYSTOLIC BLOOD PRESSURE: 136 MMHG

## 2023-05-16 PROCEDURE — 99024 POSTOP FOLLOW-UP VISIT: CPT

## 2023-05-16 NOTE — DISEASE MANAGEMENT
[3] : T3 [a] : a [1] : N1 [0] : M0 [0-10] : 0 -10 ng/mL [Biopsy with Fusion] : Patient had a biopsy with fusion on [8] : Fusion Biopsy Desi Score: 8 [5] : 5 [IIC] : IIC [] : Patient had no CT scan performed [BiopsyDate] : 12/14/2021 [MeasuredProstateVolume] : 61 [TotalCores] : 15 [TotalPositiveCores] : 4 [MaxCoreInvolvement] : 60% [BoneScanResults] : Shows equivocal bone lesion in L4.Presumed peridontal inflammatory changes in the horizontal ramus of the right hemimandible.

## 2023-05-16 NOTE — HISTORY OF PRESENT ILLNESS
[FreeTextEntry1] : Mr. Oscar Reyna is a 82 year old male with a diagnosis of favorable/ unfavorable intermediate risk prostate adenocarcinoma, pretreatment PSA: 9.9 ng/mL,  [place] Score : 8 (4+4), 4/15 cores positive with 60% involvement , cT1c (T3a on MRI). Bone scan shows equivocal bone lesion in L4.\par He was initially seen by  on 01/18/2022. He was treated to Prostate/SV/LN with 26 Fx or 7020cGy from 08/25/2022 - 10/04/2022. On 18 months of ADT with . On Orgovyx tablets in 04/2022.\par \par (Urologist: )\par \par PSA Trend:\par 06/03/2019: 6.52 ng/mL\par 12/09/2019: 6.63 ng/mL\par 08/17/2020: 7.05 ng/mL\par 03/22/2021: 7.81 ng/mL\par 09/20/2021: 9.90 ng/mL\par 02/07/2022:11.40 ng/mL\par 05/23/2022: 2.95 ng/mL\par 12/08/2022: 0.03 ng/mL\par 04/17/2023: <0.01 ng/mL\par \par Patient is here for follow up. he complains of nocturia x 2-3, not on Flomax. He also has incomplete emptying, urinary frequency, intermittency and weak stream.  No bowel movement issues.He is not sexually active.\par Evaluated by  on 04/17/2023 and continued on Orgovyx tablets.

## 2023-05-16 NOTE — REVIEW OF SYSTEMS
[Nocturia] : nocturia [Urinary Frequency] : urinary frequency [Negative] : Endocrine [Constipation: Grade 0] : Constipation: Grade 0 [Diarrhea: Grade 0] : Diarrhea: Grade 0 [Rectal Pain: Grade 0] : Rectal Pain: Grade 0 [Hematuria: Grade 0] : Hematuria: Grade 0 [Urinary Retention: Grade 0] : Urinary Retention: Grade 0 [Urinary Tract Pain: Grade 0] : Urinary Tract Pain: Grade 0 [Urinary Urgency: Grade 1 - Present] : Urinary Urgency: Grade 1 - Present [Urinary Frequency: Grade 1 - Present] : Urinary Frequency: Grade 1 - Present [Erectile Dysfunction: Grade 1 - Decrease in erectile function (frequency or rigidity of erections) but intervention not indicated (e.g., medication or use of mechanical device, penile pump)] : Erectile Dysfunction: Grade 1 - Decrease in erectile function (frequency or rigidity of erections) but intervention not indicated (e.g., medication or use of mechanical device, penile pump) [IPSS Score (0-40): ___] : IPSS score: [unfilled] [EPIC-CP Score (0-60): ___] : EPIC-CP score: [unfilled] [Urinary Incontinence: Grade 0] : Urinary Incontinence: Grade 0  [Ejaculation Disorder: Grade 1 - Diminished ejaculation] : Ejaculation Disorder: Grade 1 - Diminished ejaculation  [Urinary Ostomy] : no ~T urinary ostomy present

## 2023-07-10 ENCOUNTER — APPOINTMENT (OUTPATIENT)
Dept: UROLOGY | Facility: CLINIC | Age: 83
End: 2023-07-10
Payer: MEDICARE

## 2023-07-10 VITALS
WEIGHT: 158 LBS | HEART RATE: 79 BPM | BODY MASS INDEX: 22.62 KG/M2 | HEIGHT: 70 IN | DIASTOLIC BLOOD PRESSURE: 62 MMHG | TEMPERATURE: 97.2 F | SYSTOLIC BLOOD PRESSURE: 102 MMHG

## 2023-07-10 PROCEDURE — 99213 OFFICE O/P EST LOW 20 MIN: CPT

## 2023-07-10 NOTE — ASSESSMENT
[FreeTextEntry1] : 83yo male cT1c, Desi 8, GG2, involving 2/13 cores, PSA 9.9\par Patient is considered high risk by NCCN guidelines. \par PSMA PET, no bone lesion but concerning small lymph nodes that are PET avid\par Completed EBRT\par 2 years of ADT was recommended. He is reluctant to complete 2 years but is amenable to 18 months \par Continue Orgovyx which is associated with lower cardiovascular risks\par Tolerating Orgovyx well\par Orgovyx renewed today\par F/u with Rad Onc \par Reviewed labs from 5/2023\par \par F/u 3 months

## 2023-07-10 NOTE — HISTORY OF PRESENT ILLNESS
[Urinary Urgency] : urinary urgency [Urinary Frequency] : urinary frequency [Nocturia] : nocturia [None] : None [FreeTextEntry1] : This is a 79yo male here for evaluation of elevated PSA. PSA in 4/2019 was 6.2. Repeat level was 7.2 in 5/2019. No prior PSA levels available. No family history of PCa. AUASS = 15. \par \par 12/09/19 Here for f/u. Last PSA was 6.5. No voiding complaints. \par \par 8/17/20 Here for f/u. Last PSA was stable in 6-7 range. No significant voiding complaints. \par \par 3/22/21 Here for f/u. Last PSA was stable in 6-7 range. No significant voiding complaints. \par \par 9/20/21 Here for f/u. Last PSA = 7.8.  No significant voiding complaints. \par \par 12/22/21 Here for f/u. PSA in 9/2021 up to 9.9. MRI 10/2021 showed PI-RADS 5 lesion right peripheral zone\par S/p MRI fusion biopsy 12/14/21\par Path: [place] 4+4=8, GG4, 2/13 cores including MRI lesion and right peripheral zone \par \par 2/07/22 Here for f/u. Had bone scan which shows equivocal bone lesion in L4. No symptoms\par Met with Rad Onc. Interested in proceeding with radiation but interested in deferring ADT\par \par 3/21/22 Here for f/u to review PSMA PET\par PET results:\par 1. Since 10/7/2021, the prostate is again enlarged and contains a focus of pilflufolastat avidity in the right (identified on prior MR prostate) posterior transitional zone toward the apex, consistent with known prostate malignancy.\par \par 2. There is a 0.9 cm intensely pilflufolastat avid (SUV 24.6) perirectal lymph node and 0.5 mm mildly avid lymph node slightly superiorly, consistent with local metastasis.\par \par 3. 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  Unfortunately this possibility cannot be confirmed on the accompanying non-contrast CT and no definite adenopathy is present.\par \par 5/23/22 Here for f/u. Has been on Orgovyx for about 6 weeks. Doing well, minimal side effects. Has not started radiation yet. \par \par 8/31/22 Here for f/u. Currently undergoing radiation. Some LUTS including urgency, frequency, weak stream. Symptoms initially improved on Orgovyx but now worsening. Symptoms are moderate. \par \par 12/19/22 Here for f/u. Completed radiation treatment. Feeling well. PSA last week = 0.03. Labs done by PCP show mild anemia, Hgb =11. \par \par 4/17/23 Here for f/u. Overall doing well, no significant complaints. PSA 12/2022 = 0.03.\par \par 7/10/23 Here for f/u. Overall doing OK. Labs 5/2023: PSA < 0.04, Test = 14, Hgb = 12 [Dysuria] : no dysuria [Hematuria - Gross] : no gross hematuria [Fatigue] : fatigue

## 2023-07-10 NOTE — LETTER BODY
[Dear  ___] : Dear  [unfilled], [Courtesy Letter:] : I had the pleasure of seeing your patient, [unfilled], in my office today. [Please see my note below.] : Please see my note below. [Consult Closing:] : Thank you very much for allowing me to participate in the care of this patient.  If you have any questions, please do not hesitate to contact me. [Sincerely,] : Sincerely, [FreeTextEntry2] : Trey Lopez, \par [address]\[place] [address] [FreeTextEntry3] : Jay Fleming MD\par Urologic Oncology\par Department of Urology\par [facility]\par \par [employer] [facility] \par \par

## 2023-10-23 ENCOUNTER — APPOINTMENT (OUTPATIENT)
Dept: UROLOGY | Facility: CLINIC | Age: 83
End: 2023-10-23
Payer: MEDICARE

## 2023-10-23 VITALS
TEMPERATURE: 98.2 F | DIASTOLIC BLOOD PRESSURE: 71 MMHG | WEIGHT: 158 LBS | BODY MASS INDEX: 22.62 KG/M2 | HEIGHT: 70 IN | SYSTOLIC BLOOD PRESSURE: 135 MMHG | HEART RATE: 80 BPM

## 2023-10-23 DIAGNOSIS — Z92.3 PERSONAL HISTORY OF IRRADIATION: ICD-10-CM

## 2023-10-23 PROCEDURE — 99213 OFFICE O/P EST LOW 20 MIN: CPT

## 2023-10-24 LAB
PSA SERPL-MCNC: <0.01 NG/ML
TESTOST SERPL-MCNC: <2.5 NG/DL

## 2023-10-25 ENCOUNTER — NON-APPOINTMENT (OUTPATIENT)
Age: 83
End: 2023-10-25

## 2023-11-09 NOTE — REASON FOR VISIT
[Consideration of Curative Therapy] : consideration of curative therapy for prostate cancer Surgeon: Jorge Jacobson MD

## 2023-11-16 ENCOUNTER — APPOINTMENT (OUTPATIENT)
Dept: RADIATION ONCOLOGY | Facility: CLINIC | Age: 83
End: 2023-11-16
Payer: MEDICARE

## 2023-11-16 VITALS
WEIGHT: 154 LBS | TEMPERATURE: 97.8 F | RESPIRATION RATE: 18 BRPM | BODY MASS INDEX: 22.05 KG/M2 | OXYGEN SATURATION: 100 % | DIASTOLIC BLOOD PRESSURE: 70 MMHG | SYSTOLIC BLOOD PRESSURE: 140 MMHG | HEART RATE: 80 BPM | HEIGHT: 70 IN

## 2023-11-16 PROCEDURE — 99213 OFFICE O/P EST LOW 20 MIN: CPT

## 2024-02-05 ENCOUNTER — APPOINTMENT (OUTPATIENT)
Dept: UROLOGY | Facility: CLINIC | Age: 84
End: 2024-02-05

## 2024-02-22 ENCOUNTER — APPOINTMENT (OUTPATIENT)
Dept: UROLOGY | Facility: CLINIC | Age: 84
End: 2024-02-22
Payer: MEDICARE

## 2024-02-22 VITALS
TEMPERATURE: 98 F | DIASTOLIC BLOOD PRESSURE: 61 MMHG | HEIGHT: 70 IN | BODY MASS INDEX: 22.05 KG/M2 | HEART RATE: 68 BPM | WEIGHT: 154 LBS | OXYGEN SATURATION: 98 % | SYSTOLIC BLOOD PRESSURE: 146 MMHG

## 2024-02-22 PROCEDURE — 99213 OFFICE O/P EST LOW 20 MIN: CPT

## 2024-02-22 NOTE — PHYSICAL EXAM
[General Appearance - Well Nourished] : well nourished [General Appearance - Well Developed] : well developed [Normal Appearance] : normal appearance [Well Groomed] : well groomed [General Appearance - In No Acute Distress] : no acute distress [] : no respiratory distress [Exaggerated Use Of Accessory Muscles For Inspiration] : no accessory muscle use [Respiration, Rhythm And Depth] : normal respiratory rhythm and effort [Oriented To Time, Place, And Person] : oriented to person, place, and time [No Focal Deficits] : no focal deficits

## 2024-02-22 NOTE — HISTORY OF PRESENT ILLNESS
[FreeTextEntry1] : Dr. Flako Ochoa-hematologist [address],  [address] [place] [address]  CC: Prostate Cancer  83 year old male with history of [place] 8 CaP diagnosed in 12/2021 Completed 18 months of ADT along with EBRT Has been off Orgovox since 10/2023 Per patient developed anemia from medication and is following with hematologist.  Last PSA was < 0.01 ng/mL on 10/24/23 Testosterone <2.5 ng/dL on 10/23/23  Feeling well today, physically active. Doing well today.  Currently reports nocturia x 4 which has been on going.  Not overly bothered, does not want medication. Minimal daytime urgency  Denies any hematuria or dysuria.

## 2024-02-22 NOTE — ASSESSMENT
[FreeTextEntry1] : Diagnosis: Prostate Cancer  Plan: PSA and testosterone Continue with hematology follow up. Discussed behavior modifications with patient, decrease bladder irritants.   RTC in 3 months     Aman Evans MD, FACS, FRCS  of Urology [facility] Director of Laparoscopic and Robotic Surgery [facility] Director of Urology, [facility] Professor of Urology (Office) [phone] (Cell) [phone] [email or web address]   I performed history/review of imaging, discussed treatment plan with patient, agree with above transcription by KULWANT.   The total amount of time I have personally spent preparing for this visit, reviewing the patient's test results, obtaining external history, ordering tests/medications, documenting clinical information, communicating with and counseling the patient/family and/or caregiver(s), and spent face to face with the patient explaining the above was minutes =20

## 2024-02-23 LAB
PSA SERPL-MCNC: <0.01 NG/ML
TESTOST SERPL-MCNC: 68.8 NG/DL

## 2024-04-23 LAB — PSA SERPL-MCNC: <0.01 NG/ML

## 2024-04-24 ENCOUNTER — NON-APPOINTMENT (OUTPATIENT)
Age: 84
End: 2024-04-24

## 2024-04-25 ENCOUNTER — APPOINTMENT (OUTPATIENT)
Dept: RADIATION ONCOLOGY | Facility: CLINIC | Age: 84
End: 2024-04-25
Payer: MEDICARE

## 2024-05-23 ENCOUNTER — NON-APPOINTMENT (OUTPATIENT)
Age: 84
End: 2024-05-23

## 2024-05-23 ENCOUNTER — APPOINTMENT (OUTPATIENT)
Dept: RADIATION ONCOLOGY | Facility: CLINIC | Age: 84
End: 2024-05-23
Payer: MEDICARE

## 2024-05-23 VITALS
OXYGEN SATURATION: 98 % | WEIGHT: 162 LBS | HEART RATE: 70 BPM | TEMPERATURE: 97.7 F | DIASTOLIC BLOOD PRESSURE: 70 MMHG | SYSTOLIC BLOOD PRESSURE: 123 MMHG | RESPIRATION RATE: 20 BRPM | BODY MASS INDEX: 23.24 KG/M2

## 2024-05-23 DIAGNOSIS — C61 MALIGNANT NEOPLASM OF PROSTATE: ICD-10-CM

## 2024-05-23 PROCEDURE — 99213 OFFICE O/P EST LOW 20 MIN: CPT

## 2024-05-23 PROCEDURE — G2211 COMPLEX E/M VISIT ADD ON: CPT

## 2024-05-23 NOTE — DISEASE MANAGEMENT
[] : Patient had no CT scan performed [BiopsyDate] : 12/14/2021 [MeasuredProstateVolume] : 61 [TotalCores] : 15 [TotalPositiveCores] : 4 [MaxCoreInvolvement] : 60% [BoneScanResults] : Shows equivocal bone lesion in L4.Presumed peridontal inflammatory changes in the horizontal ramus of the right hemimandible.

## 2024-05-23 NOTE — HISTORY OF PRESENT ILLNESS
[FreeTextEntry1] : Mr. 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 Bone scan shows equivocal bone lesion in L4. He was initially seen by  on 01/18/2022. He was treated to Prostate/SV/LN with 26 Fx or 7020cGy from 08/25/2022 - 10/04/2022. On 18 months of ADT with . On Orgovyx tablets in 04/2022.and completed 10/2023.   (Urologist: )  PSA Trend: ng/mL  06/03/2019: 6.52 ng/mL 12/09/2019: 6.63 ng/mL 08/17/2020: 7.05 ng/mL 03/22/2021: 7.81 ng/mL 09/20/2021: 9.90 ng/mL 02/07/2022:11.40 ng/mL 05/23/2022: 2.95 ng/mL     Testosterone-5.9ng/dL 12/08/2022: 0.03 ng/mL 04/17/2023: <0.01 ng/mL- Testosterone 7.7ng/dL 10/23/2023: <0.01ng/mL-  Testosterone: <2.5ng/dL 4/18/2024:   <0.01ng/mL  Patient is here for follow up. He complains of nocturia x 2-3, not on Flomax. He also has incomplete emptying, urinary frequency, intermittency and weak stream.  No bowel movement issues. He is not sexually active. He has  completed ADT in October 2023, Testosterone level increase slightly and will follow up .Continues to feel fatigue and seeing a hematologist for his anemia that is controlled

## 2024-05-23 NOTE — REVIEW OF SYSTEMS
[Nocturia] : nocturia [Urinary Frequency] : urinary frequency [IPSS Score (0-40): ___] : IPSS score: [unfilled] [EPIC-CP Score (0-60): ___] : EPIC-CP score: [unfilled] [Negative] : Allergic/Immunologic [Anal Pain: Grade 0] : Anal Pain: Grade 0 [Constipation: Grade 0] : Constipation: Grade 0 [Diarrhea: Grade 0] : Diarrhea: Grade 0 [Dyspepsia: Grade 0] : Dyspepsia: Grade 0 [Dysphagia: Grade 0] : Dysphagia: Grade 0 [Esophagitis: Grade 0] : Esophagitis: Grade 0 [Fecal Incontinence: Grade 0] : Fecal Incontinence: Grade 0 [Gastroparesis: Grade 0] : Gastroparesis: Grade 0 [Nausea: Grade 0] : Nausea: Grade 0 [Proctitis: Grade 0] : Proctitis: Grade 0 [Rectal Pain: Grade 0] : Rectal Pain: Grade 0 [Small Intestinal Obstruction: Grade 0] : Small Intestinal Obstruction: Grade 0 [Vomiting: Grade 0] : Vomiting: Grade 0 [Hematuria: Grade 0] : Hematuria: Grade 0 [Urinary Incontinence: Grade 0] : Urinary Incontinence: Grade 0  [Urinary Retention: Grade 0] : Urinary Retention: Grade 0 [Urinary Tract Pain: Grade 0] : Urinary Tract Pain: Grade 0 [Urinary Urgency: Grade 0] : Urinary Urgency: Grade 0 [Urinary Frequency: Grade 0] : Urinary Frequency: Grade 0 [Ejaculation Disorder: Grade 1 - Diminished ejaculation] : Ejaculation Disorder: Grade 1 - Diminished ejaculation  [Erectile Dysfunction: Grade 1 - Decrease in erectile function (frequency or rigidity of erections) but intervention not indicated (e.g., medication or use of mechanical device, penile pump)] : Erectile Dysfunction: Grade 1 - Decrease in erectile function (frequency or rigidity of erections) but intervention not indicated (e.g., medication or use of mechanical device, penile pump) [Urinary Ostomy] : no ~T urinary ostomy present

## 2024-05-24 LAB — TESTOST SERPL-MCNC: 103 NG/DL

## 2024-06-06 ENCOUNTER — APPOINTMENT (OUTPATIENT)
Dept: UROLOGY | Facility: CLINIC | Age: 84
End: 2024-06-06
Payer: MEDICARE

## 2024-06-06 VITALS
SYSTOLIC BLOOD PRESSURE: 113 MMHG | HEART RATE: 70 BPM | DIASTOLIC BLOOD PRESSURE: 76 MMHG | TEMPERATURE: 97.3 F | OXYGEN SATURATION: 98 %

## 2024-06-06 PROCEDURE — G2211 COMPLEX E/M VISIT ADD ON: CPT

## 2024-06-06 PROCEDURE — 99213 OFFICE O/P EST LOW 20 MIN: CPT

## 2024-06-06 NOTE — HISTORY OF PRESENT ILLNESS
[FreeTextEntry1] : Dr. Flako Ochoa-hematologist [address], [place] [address]  CC: Prostate Cancer  [place] 8 CaP diagnosed in 12/2021 XRT/ADT (18 months)  Stopped Orgovox since 10/2023  PSA and testosterone personally reviewed and independently interpreted :  PSA < 0.01 ng/mL (10/24/23) PSA <0.01 ng/ml (4/2024)  Testosterone <2.5 ng/dL on 10/23/23 Testosterone 103 5/23/2024  Nocturia x 4  Mild urgency

## 2024-06-06 NOTE — ASSESSMENT
[FreeTextEntry1] : Diagnosis:  History of CAP and currently MACKENZIE  Plan  PSA 16-12 months  Aman Evans MD, FACS, FRCS  of Urology [facility] Director of Laparoscopic and Robotic Surgery [facility] Director of Urology, [facility] Professor of Urology   (Office) [phone] (Cell)  [phone] [email or web address]  The total amount of time I have personally spent preparing for this visit, reviewing the patient's test results, obtaining external history, ordering tests/medications, documenting clinical information, communicating with and counseling the patient/family and/or caregiver(s), and spent face to face with the patient explaining the above was minutes =20

## 2024-12-05 ENCOUNTER — NON-APPOINTMENT (OUTPATIENT)
Age: 84
End: 2024-12-05

## 2024-12-05 ENCOUNTER — APPOINTMENT (OUTPATIENT)
Dept: RADIATION ONCOLOGY | Facility: CLINIC | Age: 84
End: 2024-12-05
Payer: MEDICARE

## 2024-12-05 VITALS
HEART RATE: 65 BPM | WEIGHT: 162 LBS | RESPIRATION RATE: 18 BRPM | TEMPERATURE: 97.2 F | BODY MASS INDEX: 23.19 KG/M2 | OXYGEN SATURATION: 98 % | DIASTOLIC BLOOD PRESSURE: 69 MMHG | SYSTOLIC BLOOD PRESSURE: 129 MMHG | HEIGHT: 70 IN

## 2024-12-05 DIAGNOSIS — C61 MALIGNANT NEOPLASM OF PROSTATE: ICD-10-CM

## 2024-12-05 PROCEDURE — G2211 COMPLEX E/M VISIT ADD ON: CPT

## 2024-12-05 PROCEDURE — 99213 OFFICE O/P EST LOW 20 MIN: CPT

## 2025-06-05 ENCOUNTER — NON-APPOINTMENT (OUTPATIENT)
Age: 85
End: 2025-06-05

## 2025-06-05 ENCOUNTER — APPOINTMENT (OUTPATIENT)
Dept: RADIATION ONCOLOGY | Facility: CLINIC | Age: 85
End: 2025-06-05
Payer: MEDICARE

## 2025-06-05 VITALS
HEART RATE: 70 BPM | DIASTOLIC BLOOD PRESSURE: 67 MMHG | BODY MASS INDEX: 22.76 KG/M2 | HEIGHT: 70 IN | WEIGHT: 159 LBS | SYSTOLIC BLOOD PRESSURE: 121 MMHG | TEMPERATURE: 97.9 F | OXYGEN SATURATION: 98 % | RESPIRATION RATE: 18 BRPM

## 2025-06-05 DIAGNOSIS — C61 MALIGNANT NEOPLASM OF PROSTATE: ICD-10-CM

## 2025-06-05 PROCEDURE — 99213 OFFICE O/P EST LOW 20 MIN: CPT

## 2025-06-05 PROCEDURE — G2211 COMPLEX E/M VISIT ADD ON: CPT

## 2025-06-24 ENCOUNTER — NON-APPOINTMENT (OUTPATIENT)
Age: 85
End: 2025-06-24

## 2025-06-26 ENCOUNTER — APPOINTMENT (OUTPATIENT)
Dept: UROLOGY | Facility: CLINIC | Age: 85
End: 2025-06-26
Payer: MEDICARE

## 2025-06-26 VITALS — BODY MASS INDEX: 22.48 KG/M2 | HEIGHT: 70 IN | WEIGHT: 157 LBS

## 2025-06-26 PROCEDURE — 99214 OFFICE O/P EST MOD 30 MIN: CPT
